# Patient Record
Sex: MALE | Race: WHITE
[De-identification: names, ages, dates, MRNs, and addresses within clinical notes are randomized per-mention and may not be internally consistent; named-entity substitution may affect disease eponyms.]

---

## 2019-07-19 ENCOUNTER — HOSPITAL ENCOUNTER (EMERGENCY)
Dept: HOSPITAL 97 - ER | Age: 64
Discharge: HOME | End: 2019-07-19
Payer: COMMERCIAL

## 2019-07-19 VITALS — OXYGEN SATURATION: 99 % | DIASTOLIC BLOOD PRESSURE: 70 MMHG | TEMPERATURE: 97.5 F | SYSTOLIC BLOOD PRESSURE: 122 MMHG

## 2019-07-19 DIAGNOSIS — K21.9: ICD-10-CM

## 2019-07-19 DIAGNOSIS — I10: ICD-10-CM

## 2019-07-19 DIAGNOSIS — R10.30: Primary | ICD-10-CM

## 2019-07-19 LAB
BUN BLD-MCNC: 17 MG/DL (ref 7–18)
GLUCOSE SERPLBLD-MCNC: 100 MG/DL (ref 74–106)
HCT VFR BLD CALC: 41.1 % (ref 39.6–49)
LYMPHOCYTES # SPEC AUTO: 2.8 K/UL (ref 0.7–4.9)
PMV BLD: 8.4 FL (ref 7.6–11.3)
POTASSIUM SERPL-SCNC: 3.8 MMOL/L (ref 3.5–5.1)
RBC # BLD: 4.49 M/UL (ref 4.33–5.43)

## 2019-07-19 PROCEDURE — 96374 THER/PROPH/DIAG INJ IV PUSH: CPT

## 2019-07-19 PROCEDURE — 80048 BASIC METABOLIC PNL TOTAL CA: CPT

## 2019-07-19 PROCEDURE — 99284 EMERGENCY DEPT VISIT MOD MDM: CPT

## 2019-07-19 PROCEDURE — 96361 HYDRATE IV INFUSION ADD-ON: CPT

## 2019-07-19 PROCEDURE — 96375 TX/PRO/DX INJ NEW DRUG ADDON: CPT

## 2019-07-19 PROCEDURE — 36415 COLL VENOUS BLD VENIPUNCTURE: CPT

## 2019-07-19 PROCEDURE — 85025 COMPLETE CBC W/AUTO DIFF WBC: CPT

## 2019-07-19 PROCEDURE — 74177 CT ABD & PELVIS W/CONTRAST: CPT

## 2019-07-19 NOTE — ER
Nurse's Notes                                                                                     

 [facility]                                                                 

Name: Lee Álvarez                                                                                  

Age: 63 yrs                                                                                       

Sex: Male                                                                                         

: 1955                                                                                   

MRN: [id number]                                                                                   

Arrival Date: 2019                                                                          

Time: 18:15                                                                                       

Account#: [id number]                                                                            

Bed 20                                                                                            

Private MD: Miles Duran E                                                                     

Diagnosis: Lower abdominal pain, unspecified                                                      

                                                                                                  

Presentation:                                                                                     

                                                                                             

18:21 Presenting complaint: Patient states: "I woke up this morning with low abdominal pain   aj1 

      below the navel and in the groin. Through out the day the pain has remained constant.       

      I've been nauseous, and feeling light headed I've had several bowel movements" Patient      

      reports a history of inguinal hernia that has been suspected to cause him pain in the       

      same area, he has also been seeing Dr. Gonzales who is trying to figure out if he has          

      Crohns disease. Today is the worst he has felt so he came to the ER for evaluation.         

      Transition of care: patient was not received from another setting of care. Onset of         

      symptoms was 2019. Risk Assessment: Do you want to hurt yourself or someone        

      else? Patient reports no desire to harm self or others. Initial Sepsis Screen: Does the     

      patient meet any 2 criteria? No. Patient's initial sepsis screen is negative. Does the      

      patient have a suspected source of infection? Yes: Acute abdominal pain. Care prior to      

      arrival: None.                                                                              

18:21 Method Of Arrival: Ambulatory                                                           aj1 

18:21 Acuity: GERRI 3                                                                           aj1 

                                                                                                  

Triage Assessment:                                                                                

18:28 General: Appears in no apparent distress. uncomfortable, Behavior is calm, cooperative, aj1 

      appropriate for age. Pain: Complains of pain in left lower quadrant and pelvis Pain         

      currently is 3 out of 10 on a pain scale. Neuro: Level of Consciousness is awake,           

      alert, obeys commands. Cardiovascular: Patient's skin is warm and dry. Respiratory:         

      Airway is patent Respiratory effort is even, unlabored, Respiratory pattern is regular,     

      symmetrical. GI: Reports lower abdominal pain, nausea, Patient currently denies             

      vomiting.                                                                                   

                                                                                                  

Historical:                                                                                       

- Allergies:                                                                                      

18:28 No Known Allergies;                                                                     aj1 

- Home Meds:                                                                                      

18:28 amitriptyline-chlordiazepoxide 25-10 mg oral tab 1 tab in the evening [Active];         aj1 

      amlodipine 10 mg tab 1 tab once daily [Active]; metoprolol succinate 50 mg oral Tb24 1      

      tab once daily [Active]; esomeprazole magnesium 40 mg oral cpDR 1 cap 2 times per day       

      [Active]; Multiple Vitamins oral tab daily [Active]; fenofibrate 150 mg oral cap 1 cap      

      once daily [Active]; alprazolam 0.5 mg Oral Tb24 1 tab as needed [Active]; Fish Oil         

      oral oral daily [Active]; mesalamine oral 1.2 GM oral 2 caps 2 times per day [Active];      

      cranberry oral oral twice a day [Active]; ranitidine HCl 150 mg Oral tab 1 tab once         

      daily [Active]; Miralax 17 gram Oral pwpk 1 packet as needed [Active];                      

- PMHx:                                                                                           

18:28 Hernia; Hypertension; GERD;                                                             aj1 

- PSHx:                                                                                           

18:28 Hernia repair; prostate surgery;                                                        aj1 

                                                                                                  

- Immunization history:: Flu vaccine is up to date.                                               

- Social history:: Smoking status: Patient/guardian denies using tobacco.                         

- Ebola Screening: : Patient denies travel to an Ebola-affected area in the 21 days               

  before illness onset.                                                                           

                                                                                                  

                                                                                                  

Screenin:29 Abuse screen: Denies threats or abuse. Nutritional screening: No deficits noted.        tw2 

      Tuberculosis screening: No symptoms or risk factors identified. Fall Risk Fall in past      

      12 months (25 points).                                                                      

                                                                                                  

Assessment:                                                                                       

18:37 General: Appears in no apparent distress. Behavior is calm, cooperative, appropriate    tw2 

      for age. Pain: Complains of pain in pelvis and left lower quadrant. Neuro: Level of         

      Consciousness is awake, alert, obeys commands, Oriented to person, place, time,             

      situation. Cardiovascular: Heart tones S1 S2 Patient's skin is warm and dry.                

      Respiratory: Airway is patent Respiratory effort is even, unlabored, Respiratory            

      pattern is regular, symmetrical, Breath sounds are clear bilaterally. GI: Bowel sounds      

      present X 4 quads. Abd is soft X 4 quads Reports lower abdominal pain, diarrhea,            

      nausea. : No signs and/or symptoms were reported regarding the genitourinary system.      

      EENT: No signs and/or symptoms were reported regarding the EENT system. Derm: No signs      

      and/or symptoms reported regarding the dermatologic system. Musculoskeletal: Range of       

      motion: intact in all extremities.                                                          

19:05 General: Appears in no apparent distress. comfortable, Behavior is calm, cooperative,   rr5 

      appropriate for age. Neuro: Level of Consciousness is awake, alert, obeys commands,         

      Oriented to person, place, time, situation. Cardiovascular: Capillary refill < 3            

      seconds Patient's skin is warm and dry. Respiratory: Airway is patent Respiratory           

      effort is even, unlabored, Respiratory pattern is regular, symmetrical. GI: Abdomen is      

      flat, Reports lower abdominal pain, diarrhea, nausea. : No signs and/or symptoms were     

      reported regarding the genitourinary system. EENT: No signs and/or symptoms were            

      reported regarding the EENT system. Derm: Skin is intact, Skin temperature is warm.         

      Musculoskeletal: Circulation, motion, and sensation intact. Capillary refill < 3            

      seconds, Range of motion: intact in all extremities.                                        

19:35 Reassessment: Patient appears in no apparent distress at this time. Patient is alert,   rr5 

      oriented x 3, equal unlabored respirations, skin warm/dry/pink. send to CT scan.            

20:20 Reassessment: Patient appears in no apparent distress at this time. Patient is alert,   rr5 

      oriented x 3, equal unlabored respirations, skin warm/dry/pink. discharge instruction       

      given and explained without complaints made.                                                

                                                                                                  

Vital Signs:                                                                                      

18:28  / 83; Pulse 79; Resp 18; Temp 98.0; Pulse Ox 98% on R/A; Weight 102.06 kg (R);   aj1 

      Height 6 ft. 4 in. (193.04 cm) (R); Pain 3/10;                                              

19:30  / 75; Pulse 70; Resp 16; Temp 97.6; Pulse Ox 98% ;                               rr5 

20:20  / 70; Pulse 75; Resp 17; Temp 97.5; Pulse Ox 99% on R/A;                         rr5 

18:28 Body Mass Index 27.39 (102.06 kg, 193.04 cm)                                            aj1 

                                                                                                  

ED Course:                                                                                        

18:15 Patient arrived in ED.                                                                  rg4 

18:15 Miles Duran MD is Private Physician.                                                rg4 

18:23 Triage completed.                                                                       aj1 

18:29 Arm band placed on.                                                                     tw2 

18:31 Elinor Perez, NEGAR is Primary Nurse.                                                        tw2 

18:31 [facility]                                                        kb  

18:31 Jethro Kaur MD is Attending Physician.                                              kb  

18:31 Bed in low position. Call light in reach. Adult w/ patient.                             tw2 

18:42 Radiology exam delayed due to lab results not completed at this time. (BUN/Creatinine). vm2 

18:53 Inserted saline lock: 20 gauge in right antecubital area, using aseptic technique.      tw2 

      Blood collected.                                                                            

18:59 Report given to NEGAR Uribe.                                                             tw2 

19:14 Radiology exam delayed due to lab results not completed at this time. (BUN/Creatinine). vm2 

19:49 CT completed. Patient tolerated procedure well. Patient moved to CT via wheelchair.     nj  

      Patient moved back from CT.                                                                 

19:50 CT Abd/Pelvis - IV Contrast Only In Process Unspecified.                                EDMS

20:20 No provider procedures requiring assistance completed. IV discontinued, intact,         rr5 

      bleeding controlled, No redness/swelling at site. Pressure dressing applied.                

                                                                                                  

Administered Medications:                                                                         

18:53 Drug: Zofran 4 mg Route: IVP; Site: right antecubital;                                  tw2 

20:00 Follow up: Response: No adverse reaction                                                rr5 

18:55 Drug: morphine 4 mg Route: IVP; Site: right antecubital;                                tw2 

20:00 Follow up: Response: No adverse reaction                                                rr5 

18:56 Drug: NS 0.9% 1000 ml Route: IV; Rate: 1000 ml; Site: right antecubital;                tw2 

20:00 Follow up: Response: No adverse reaction; IV Status: Completed infusion; IV Intake:     rr5 

      1000ml                                                                                      

                                                                                                  

                                                                                                  

Intake:                                                                                           

20:00 IV: 1000ml; Total: 1000ml.                                                              rr5 

                                                                                                  

Outcome:                                                                                          

20:05 Discharge ordered by MD.                                                                kb  

20:20 Discharged to home ambulatory, with family.                                             rr5 

20:20 Condition: stable                                                                           

20:20 Discharge instructions given to patient, family, Instructed on discharge instructions,      

      follow up and referral plans. medication usage, Demonstrated understanding of               

      instructions, follow-up care, medications, Prescriptions given X 2.                         

20:35 Patient left the ED.                                                                    rr5 

                                                                                                  

Signatures:                                                                                       

Dispatcher [employer]                           EDMS                                                 

Emerald Cabello, BUZZP-C                 BUZZP-Chula Calixto, RN                     RN   Elinor Dean RN                          RN   2                                                  

Susana Bustamante Nathan nj McGuire, Victoria                            2                                                  

Rony Ventura, NEGAR                      RN   rr5                                                  

                                                                                                  

**************************************************************************************************

## 2019-07-19 NOTE — RAD REPORT
EXAM DESCRIPTION:  CTAbdomen   Pelvis W Contrast - 7/19/2019 7:49 pm

 

CLINICAL HISTORY:  Abdominal pain.

ABD PAIN

 

COMPARISON:  Abdomen   Pelvis W Contrast dated 5/30/2019; Abdomen   Pelvis W Contrast dated 7/10/2018
; Abdomen   Pelvis W Contrast dated 9/30/2016; CT ABD PELVIS W CONTRAST dated 11/6/2014

 

TECHNIQUE:  Biphasic CT imaging of the abdomen and pelvis was performed with 100 ml non-ionic IV cont
rast.

 

All CT scans are performed using dose optimization technique as appropriate and may include automated
 exposure control or mA/KV adjustment according to patient size.

 

FINDINGS:  The lung bases are clear.

 

The liver, spleen, pancreas, adrenal glands and kidneys are within normal limits. Benign bilateral re
nal cysts.

 

No bowel obstruction, free air, free fluid or abscess. Mild sigmoid diverticulosis is present without
 diverticulitis. Prominent fecal retention in the colon. The appendix is normal.  No evidence of sign
ificant lymphadenopathy.

 

No suspicious bony findings. Small bilateral fat containing inguinal hernias. Evidence of previous ri
ght inguinal hernia repair.

 

IMPRESSION:  No acute intra-abdominal or pelvic finding.

 

Mild fecal retention.

## 2019-07-19 NOTE — XMS REPORT
Patient Summary Document

 Created on:2019



Patient:SHARIFA MASSEY

Sex:Male

:1955

External Reference #:[id number]





Demographics







 Address  [address] [place] [address]

 

 Home Phone  [phone]

 

 Work Phone  [phone]

 

 Email Address  [email or web address]

 

 Preferred Language  Unknown

 

 Marital Status  Unknown

 

 [employer] Affiliation  Unknown

 

 Race  Unknown

 

 Additional Race(s)  Unavailable

 

 Ethnic Group  Unknown









Author







 [facility]  [facility]

 

 Address  [address] [place] [address] Dr. Sloan. [address] [place] [id number]

 

 Phone  [phone]









Care Team Providers







 Name  Role  Phone

 

 DR JESSICA MARTINEZ  Unavailable  Unavailable









Problems

This patient has no known problems.



Allergies, Adverse Reactions, Alerts

This patient has no known allergies or adverse reactions.



Medications

This patient has no known medications.



Encounters







 Start  End  Encounter  Admission  Attending  Care  Care  Encounter



 Date/Time  Date/Time  Type  Type  Clinicians  Facility  Department  ID

 

 2018  Outpatient  GABRIELE CARRILLO  [facility] [place]  [id number]



 04:00:00  07:00:00      JESSICA

## 2019-07-19 NOTE — EDPHYS
Physician Documentation                                                                           

 [facility]                                                                 

Name: Lee Álvarez                                                                                  

Age: 63 yrs                                                                                       

Sex: Male                                                                                         

: 1955                                                                                   

MRN: [id number]                                                                                   

Arrival Date: 2019                                                                          

Time: 18:15                                                                                       

Account#: [id number]                                                                            

Bed 20                                                                                            

Private MD: Miles Duran E                                                                     

ED Physician Jethro Kaur                                                                       

HPI:                                                                                              

                                                                                             

18:49 This 63 yrs old  Male presents to ER via Ambulatory with complaints of Groin   kb  

      Pain, Abdominal Pain, Nausea.                                                               

18:49 The patient presents with abdominal pain in the lower abdomen. Onset: The               kb  

      symptoms/episode began/occurred this morning. The symptoms do not radiate. Associated       

      signs and symptoms: Pertinent positives: diarrhea, nausea. The symptoms are described       

      as waxing/waning. Modifying factors: The symptoms are alleviated by nothing, the            

      symptoms are aggravated by nothing. Severity of pain: At its worst the pain was             

      moderate in the emergency department the pain has improved. The patient has experienced     

      similar episodes in the past. The patient has been recently seen by a physician:. Pt        

      reports he is being tested for crohn's. States he gets this lower abd pain and              

      diarrhea, which they have called a crohn's flare-up. Told to start antibiotics when         

      this happens so he started cipro and flagyl on Monday. Was seen by Dr Weiss yesterday      

      and told to continue antibiotics. States the pain normally gets better throughout the       

      day when he has it, but today it didn't so that is why he came in. .                        

                                                                                                  

Historical:                                                                                       

- Allergies:                                                                                      

18:28 No Known Allergies;                                                                     aj1 

- Home Meds:                                                                                      

18:28 amitriptyline-chlordiazepoxide 25-10 mg oral tab 1 tab in the evening [Active];         aj1 

      amlodipine 10 mg tab 1 tab once daily [Active]; metoprolol succinate 50 mg oral Tb24 1      

      tab once daily [Active]; esomeprazole magnesium 40 mg oral cpDR 1 cap 2 times per day       

      [Active]; Multiple Vitamins oral tab daily [Active]; fenofibrate 150 mg oral cap 1 cap      

      once daily [Active]; alprazolam 0.5 mg Oral Tb24 1 tab as needed [Active]; Fish Oil         

      oral oral daily [Active]; mesalamine oral 1.2 GM oral 2 caps 2 times per day [Active];      

      cranberry oral oral twice a day [Active]; ranitidine HCl 150 mg Oral tab 1 tab once         

      daily [Active]; Miralax 17 gram Oral pwpk 1 packet as needed [Active];                      

- PMHx:                                                                                           

18:28 Hernia; Hypertension; GERD;                                                             aj1 

- PSHx:                                                                                           

18:28 Hernia repair; prostate surgery;                                                        aj1 

                                                                                                  

- Immunization history:: Flu vaccine is up to date.                                               

- Social history:: Smoking status: Patient/guardian denies using tobacco.                         

- Ebola Screening: : Patient denies travel to an Ebola-affected area in the 21 days               

  before illness onset.                                                                           

                                                                                                  

                                                                                                  

ROS:                                                                                              

18:46 Constitutional: Negative for fever, chills, and weight loss, Neck: Negative for injury, kb  

      pain, and swelling, Cardiovascular: Negative for chest pain, palpitations, and edema,       

      Respiratory: Negative for shortness of breath, cough, wheezing, and pleuritic chest         

      pain, Back: Negative for injury and pain, : Negative for injury, bleeding, discharge,     

      and swelling, MS/Extremity: Negative for injury and deformity, Skin: Negative for           

      injury, rash, and discoloration, Neuro: Negative for headache, weakness, numbness,          

      tingling, and seizure.                                                                      

18:46 Abdomen/GI: Positive for abdominal pain, nausea, diarrhea, Negative for vomiting,           

      constipation, abdominal cramps, abdominal distension, anorexia.                             

                                                                                                  

Exam:                                                                                             

18:46 Constitutional:  This is a well developed, well nourished patient who is awake, alert,  kb  

      and in no acute distress. Head/Face:  Normocephalic, atraumatic. Neck:  Trachea             

      midline, no thyromegaly or masses palpated, and no cervical lymphadenopathy.  Supple,       

      full range of motion without nuchal rigidity, or vertebral point tenderness.  No            

      Meningismus. Chest/axilla:  Normal chest wall appearance and motion.  Nontender with no     

      deformity.  No lesions are appreciated. Cardiovascular:  Regular rate and rhythm with a     

      normal S1 and S2.  No gallops, murmurs, or rubs.  Normal PMI, no JVD.  No pulse             

      deficits. Respiratory:  Lungs have equal breath sounds bilaterally, clear to                

      auscultation and percussion.  No rales, rhonchi or wheezes noted.  No increased work of     

      breathing, no retractions or nasal flaring. Abdomen/GI:  Soft, non-tender, with normal      

      bowel sounds.  No distension or tympany.  No guarding or rebound.  No evidence of           

      tenderness throughout. Skin:  Warm, dry with normal turgor.  Normal color with no           

      rashes, no lesions, and no evidence of cellulitis. MS/ Extremity:  Pulses equal, no         

      cyanosis.  Neurovascular intact.  Full, normal range of motion. Neuro:  Awake and           

      alert, GCS 15, oriented to person, place, time, and situation.  Cranial nerves II-XII       

      grossly intact.  Motor strength 5/5 in all extremities.  Sensory grossly intact.            

      Cerebellar exam normal.  Normal gait.                                                       

                                                                                                  

Vital Signs:                                                                                      

18:28  / 83; Pulse 79; Resp 18; Temp 98.0; Pulse Ox 98% on R/A; Weight 102.06 kg (R);   aj1 

      Height 6 ft. 4 in. (193.04 cm) (R); Pain 3/10;                                              

19:30  / 75; Pulse 70; Resp 16; Temp 97.6; Pulse Ox 98% ;                               rr5 

20:20  / 70; Pulse 75; Resp 17; Temp 97.5; Pulse Ox 99% on R/A;                         rr5 

18:28 Body Mass Index 27.39 (102.06 kg, 193.04 cm)                                            aj1 

                                                                                                  

MDM:                                                                                              

18:31 Patient medically screened.                                                             kb  

18:48 Data reviewed: vital signs, nurses notes. Data interpreted: Pulse oximetry: on room air kb  

      is 98 %. Interpretation: normal.                                                            

20:04 Counseling: I had a detailed discussion with the patient and/or guardian regarding: the kb  

      historical points, exam findings, and any diagnostic results supporting the                 

      discharge/admit diagnosis, lab results, radiology results, the need for outpatient          

      follow up, a family practitioner, to return to the emergency department if symptoms         

      worsen or persist or if there are any questions or concerns that arise at home. ED          

      course: Pt feeling better after treatment. Will follow up with Dr Weiss.                   

                                                                                                  

                                                                                             

18:40 Order name: CBC with Diff; Complete Time: 19:12                                         kb  

                                                                                             

18:40 Order name: Basic Metabolic Panel; Complete Time: 19:22                                 kb  

                                                                                             

18:40 Order name: CT Abd/Pelvis - IV Contrast Only; Complete Time: 19:58                      kb  

                                                                                             

18:40 Order name: IV Start; Complete Time: 18:56                                              kb  

                                                                                                  

Administered Medications:                                                                         

18:53 Drug: Zofran 4 mg Route: IVP; Site: right antecubital;                                  tw2 

20:00 Follow up: Response: No adverse reaction                                                rr5 

18:55 Drug: morphine 4 mg Route: IVP; Site: right antecubital;                                tw2 

20:00 Follow up: Response: No adverse reaction                                                rr5 

18:56 Drug: NS 0.9% 1000 ml Route: IV; Rate: 1000 ml; Site: right antecubital;                tw2 

20:00 Follow up: Response: No adverse reaction; IV Status: Completed infusion; IV Intake:     rr5 

      1000ml                                                                                      

                                                                                                  

                                                                                                  

Disposition:                                                                                      

                                                                                             

17:58 Co-signature as Attending Physician, Jethro Kaur MD.                                    

                                                                                                  

Disposition:                                                                                      

19 20:05 Discharged to Home. Impression: Lower abdominal pain, unspecified.                 

- Condition is Stable.                                                                            

- Discharge Instructions: Abdominal Pain, Adult, Easy-to-Read.                                    

- Prescriptions for Bentyl 20 mg Oral Tablet - take 1 tablet by ORAL route every 6                

  hours As needed; 20 tablet. Zofran 4 mg Oral Tablet - take 1 tablet by ORAL route               

  every 6 hours As needed; 20 tablet.                                                             

- Medication Reconciliation Form, Thank You Letter, Antibiotic Education, Prescription            

  Opioid Use form.                                                                                

- Follow up: Emergency Department; When: As needed; Reason: Worsening of condition.               

  Follow up: Private Physician; When: 2 - 3 days; Reason: Recheck today's complaints,             

  Continuance of care, Re-evaluation by your physician.                                           

                                                                                                  

                                                                                                  

                                                                                                  

Signatures:                                                                                       

Dispatcher [employer]                           EDEmerald Jacques, ROSEMARIE GERBERP-Chula Calixto, RN                     RN   aj1                                                  

Elinor Perez RN                          RN   tw2                                                  

Jethro Kaur MD MD                                                      

Rony Ventura RN                      RN   rr5                                                  

                                                                                                  

Corrections: (The following items were deleted from the chart)                                    

                                                                                             

20:35 20:05 2019 20:05 Discharged to Home. Impression: Lower abdominal pain,            rr5 

      unspecified. Condition is Stable. Forms are Medication Reconciliation Form, Thank You       

      Letter, Antibiotic Education, Prescription Opioid Use. Follow up: Emergency Department;     

      When: As needed; Reason: Worsening of condition. Follow up: Private Physician; When: 2      

      - 3 days; Reason: Recheck today's complaints, Continuance of care, Re-evaluation by         

      your physician. kb                                                                          

                                                                                                  

**************************************************************************************************

## 2019-08-11 ENCOUNTER — HOSPITAL ENCOUNTER (INPATIENT)
Dept: HOSPITAL 97 - ER | Age: 64
LOS: 3 days | Discharge: HOME | DRG: 310 | End: 2019-08-14
Attending: FAMILY MEDICINE | Admitting: HOSPITALIST
Payer: COMMERCIAL

## 2019-08-11 VITALS — BODY MASS INDEX: 27.8 KG/M2

## 2019-08-11 DIAGNOSIS — F41.9: ICD-10-CM

## 2019-08-11 DIAGNOSIS — E78.5: ICD-10-CM

## 2019-08-11 DIAGNOSIS — I48.0: Primary | ICD-10-CM

## 2019-08-11 DIAGNOSIS — I10: ICD-10-CM

## 2019-08-11 LAB
ALBUMIN SERPL BCP-MCNC: 4.1 G/DL (ref 3.4–5)
ALP SERPL-CCNC: 70 U/L (ref 45–117)
ALT SERPL W P-5'-P-CCNC: 36 U/L (ref 12–78)
AST SERPL W P-5'-P-CCNC: 30 U/L (ref 15–37)
BUN BLD-MCNC: 13 MG/DL (ref 7–18)
GLUCOSE SERPLBLD-MCNC: 105 MG/DL (ref 74–106)
HCT VFR BLD CALC: 41.1 % (ref 39.6–49)
INR BLD: 0.98
LYMPHOCYTES # SPEC AUTO: 4.3 K/UL (ref 0.7–4.9)
MAGNESIUM SERPL-MCNC: 2 MG/DL (ref 1.8–2.4)
NT-PROBNP SERPL-MCNC: 38 PG/ML (ref ?–125)
PMV BLD: 8.4 FL (ref 7.6–11.3)
POTASSIUM SERPL-SCNC: 3.5 MMOL/L (ref 3.5–5.1)
RBC # BLD: 4.48 M/UL (ref 4.33–5.43)
TROPONIN (EMERG DEPT USE ONLY): < 0.02 NG/ML (ref 0–0.04)

## 2019-08-11 PROCEDURE — 96361 HYDRATE IV INFUSION ADD-ON: CPT

## 2019-08-11 PROCEDURE — 85610 PROTHROMBIN TIME: CPT

## 2019-08-11 PROCEDURE — 84439 ASSAY OF FREE THYROXINE: CPT

## 2019-08-11 PROCEDURE — 80061 LIPID PANEL: CPT

## 2019-08-11 PROCEDURE — 81003 URINALYSIS AUTO W/O SCOPE: CPT

## 2019-08-11 PROCEDURE — 93306 TTE W/DOPPLER COMPLETE: CPT

## 2019-08-11 PROCEDURE — 84443 ASSAY THYROID STIM HORMONE: CPT

## 2019-08-11 PROCEDURE — 99285 EMERGENCY DEPT VISIT HI MDM: CPT

## 2019-08-11 PROCEDURE — 96374 THER/PROPH/DIAG INJ IV PUSH: CPT

## 2019-08-11 PROCEDURE — 80048 BASIC METABOLIC PNL TOTAL CA: CPT

## 2019-08-11 PROCEDURE — 85025 COMPLETE CBC W/AUTO DIFF WBC: CPT

## 2019-08-11 PROCEDURE — 93005 ELECTROCARDIOGRAM TRACING: CPT

## 2019-08-11 PROCEDURE — 83735 ASSAY OF MAGNESIUM: CPT

## 2019-08-11 PROCEDURE — 71045 X-RAY EXAM CHEST 1 VIEW: CPT

## 2019-08-11 PROCEDURE — 96372 THER/PROPH/DIAG INJ SC/IM: CPT

## 2019-08-11 PROCEDURE — 84484 ASSAY OF TROPONIN QUANT: CPT

## 2019-08-11 PROCEDURE — 83880 ASSAY OF NATRIURETIC PEPTIDE: CPT

## 2019-08-11 PROCEDURE — 36415 COLL VENOUS BLD VENIPUNCTURE: CPT

## 2019-08-11 PROCEDURE — 80076 HEPATIC FUNCTION PANEL: CPT

## 2019-08-11 NOTE — XMS REPORT
Patient Summary Document

 Created on:2019



Patient:SHARIFA MASSEY

Sex:Male

:1955

External Reference #:[id number]





Demographics







 Address  [address] [place] [address]

 

 Home Phone  [phone]

 

 Work Phone  [phone]

 

 Email Address  [email or web address]

 

 Preferred Language  Unknown

 

 Marital Status  Unknown

 

 Jehovah's witness Affiliation  Unknown

 

 Race  Unknown

 

 Additional Race(s)  Unavailable

 

 Ethnic Group  Unknown









Author







 Organization  [facility]

 

 Address  [address] [place] [address] Dr. Sloan. [address] [place] [id number]

 

 Phone  [phone]









Care Team Providers







 Name  Role  Phone

 

 DR JESSICA MARTINEZ  Unavailable  Unavailable









Problems

This patient has no known problems.



Allergies, Adverse Reactions, Alerts

This patient has no known allergies or adverse reactions.



Medications

This patient has no known medications.



Encounters







 Start  End  Encounter  Admission  Attending  Care  Care  Encounter



 Date/Time  Date/Time  Type  Type  Clinicians  Facility  Department  ID

 

 2018  Outpatient  [facility]  [id number]



 04:00:00  07:00:00      JESSICA

## 2019-08-12 LAB
HCT VFR BLD CALC: 37.7 % (ref 39.6–49)
HDLC SERPL-MCNC: 43 MG/DL (ref 40–60)
LDLC SERPL CALC-MCNC: 85 MG/DL (ref ?–130)
LYMPHOCYTES # SPEC AUTO: 4.1 K/UL (ref 0.7–4.9)
MAGNESIUM SERPL-MCNC: 2.1 MG/DL (ref 1.8–2.4)
PMV BLD: 8.6 FL (ref 7.6–11.3)
RBC # BLD: 4.17 M/UL (ref 4.33–5.43)
TROPONIN I: < 0.02 NG/ML (ref 0–0.04)
TSH SERPL DL<=0.05 MIU/L-ACNC: 1.23 UIU/ML (ref 0.36–3.74)
UA DIPSTICK W REFLEX MICRO PNL UR: (no result)

## 2019-08-12 RX ADMIN — RIVAROXABAN SCH MG: 20 TABLET, FILM COATED ORAL at 17:11

## 2019-08-12 RX ADMIN — Medication SCH ML: at 09:20

## 2019-08-12 RX ADMIN — FENOFIBRATE SCH MG: 160 TABLET ORAL at 21:24

## 2019-08-12 RX ADMIN — Medication SCH: at 21:00

## 2019-08-12 RX ADMIN — ASPIRIN SCH MG: 81 TABLET, COATED ORAL at 09:20

## 2019-08-12 RX ADMIN — Medication SCH ML: at 21:25

## 2019-08-12 RX ADMIN — SOTALOL HYDROCHLORIDE SCH MG: 80 TABLET ORAL at 17:09

## 2019-08-12 RX ADMIN — PANTOPRAZOLE SODIUM SCH MG: 40 TABLET, DELAYED RELEASE ORAL at 21:24

## 2019-08-12 NOTE — EKG
Test Date:    2019-08-12               Test Time:    10:00:41

Technician:   CARMELINA                                     

                                                     

MEASUREMENT RESULTS:                                       

Intervals:                                           

Rate:         76                                     

WY:                                                  

QRSD:         86                                     

QT:           340                                    

QTc:          382                                    

Axis:                                                

P:                                                   

WY:                                                  

QRS:          67                                     

T:            80                                     

                                                     

INTERPRETIVE STATEMENTS:                                       

                                                     

Atrial fibrillation

Abnormal ECG

Compared to ECG 08/11/2019 23:08:28

ST (T wave) deviation no longer present



Electronically Signed On 08-12-19 10:38:51 CDT by Zaheer Awad

## 2019-08-12 NOTE — RAD REPORT
EXAM DESCRIPTION:  RAD - Chest Single View - 8/11/2019 11:22 pm

 

CLINICAL HISTORY:  SOB;Palpitations

Chest pain.

 

COMPARISON:  Chest Pa And Lat (2 Views) dated 7/2/2019; Chest Pa And Lat (2 Views) dated 7/24/2018; C
HEST SINGLE VIEW dated 10/8/2012; CHEST SINGLE VIEW dated 9/30/2009

 

FINDINGS:  Portable technique limits examination quality.

 

Mild emphysematous changes are present throughout the lungs. The heart is mildly prominent in size. N
o displaced fractures.

 

IMPRESSION:  Mild COPD.

## 2019-08-12 NOTE — EDPHYS
Physician Documentation                                                                           

 [facility]                                                                 

Name: Lee Álvarez                                                                                  

Age: 63 yrs                                                                                       

Sex: Male                                                                                         

: 1955                                                                                   

MRN: [id number]                                                                                   

Arrival Date: 2019                                                                          

Time: 22:52                                                                                       

Account#: [id number]                                                                            

Bed 2                                                                                             

Private MD:                                                                                       

ED Physician Jethro Kaur                                                                       

HPI:                                                                                              

                                                                                             

00:22 This 63 yrs old  Male presents to ER via Ambulatory with complaints of         pm1 

      Shortness Of Breath.                                                                        

00:22 The patient has shortness of breath that occurred at home, walking up the stairs.       pm1 

      Onset: The symptoms/episode began/occurred just prior to arrival. Duration: The             

      symptoms are continuous. The patient's shortness of breath is aggravated by exertion,       

      is alleviated by nothing. Associated signs and symptoms: Pertinent positives: chest         

      pain, Pertinent negatives: non-productive cough, diaphoresis, fever, nausea, vomiting.      

      Severity of symptoms: in the emergency department the symptoms are unchanged. The           

      patient has experienced a previous episode, approximately 30 years ago. The patient has     

      not recently seen a physician, [facility].                               

                                                                                                  

Historical:                                                                                       

- Allergies:                                                                                      

                                                                                             

23:10 No Known Allergies;                                                                     ss  

- Home Meds:                                                                                      

23:10 amitriptyline-chlordiazepoxide 25-10 mg Oral tab 1 tab in the evening [Active];         ss  

      amlodipine 10 mg tab 1 tab once daily [Active]; metoprolol succinate 50 mg Oral Tb24 1      

      tab once daily [Active]; esomeprazole magnesium 40 mg Oral cpDR 1 cap 2 times per day       

      [Active]; Multiple Vitamins Oral tab daily [Active]; alprazolam 0.5 mg Oral Tb24 1 tab      

      as needed [Active]; Fish Oil Oral daily [Active]; cranberry Oral twice a day [Active];      

      ranitidine HCl 150 mg Oral tab 1 tab once daily [Active]; Miralax 17 gram Oral pwpk 1       

      packet as needed [Active]; fenofibrate 145 Oral cap 1 cap once daily [Active];              

- PMHx:                                                                                           

23:10 GERD; Hernia; Hypertension;                                                             ss  

- PSHx:                                                                                           

23:10 Hernia repair; prostate surgery;                                                        ss  

                                                                                                  

- Immunization history:: Adult Immunizations up to date.                                          

- Social history:: Smoking status: Patient/guardian denies using tobacco.                         

- Ebola Screening: : Patient denies exposure to infectious person Patient denies travel           

  to an Ebola-affected area in the 21 days before illness onset.                                  

                                                                                                  

                                                                                                  

ROS:                                                                                              

                                                                                             

00:22 Constitutional: Negative for fever, chills, and weight loss, Eyes: Negative for injury, pm1 

      pain, redness, and discharge, ENT: Negative for injury, pain, and discharge, Neck:          

      Negative for injury, pain, and swelling.                                                    

      Abdomen/GI: Negative for abdominal pain, nausea, vomiting, diarrhea, and constipation,      

      Back: Negative for injury and pain, : Negative for injury, bleeding, discharge, and       

      swelling, MS/Extremity: Negative for injury and deformity, Skin: Negative for injury,       

      rash, and discoloration, Neuro: Negative for headache, weakness, numbness, tingling,        

      and seizure.                                                                                

      Cardiovascular: Positive for chest pain, palpitations, Negative for edema, orthopnea.       

      Respiratory: Positive for shortness of breath, Negative for cough, sputum production,       

      wheezing.                                                                                   

                                                                                                  

Exam:                                                                                             

00:22 Constitutional:  This is a well developed, well nourished patient who is awake, alert,  pm1 

      and in no acute distress. Head/Face:  Normocephalic, atraumatic. Eyes:  Pupils equal        

      round and reactive to light, extra-ocular motions intact.  Lids and lashes normal.          

      Conjunctiva and sclera are non-icteric and not injected.  Cornea within normal limits.      

      Periorbital areas with no swelling, redness, or edema. Chest/axilla:  Normal chest wall     

      appearance and motion.  Nontender with no deformity.  No lesions are appreciated.           

00:22 Respiratory:  Lungs have equal breath sounds bilaterally, clear to auscultation and         

      percussion.  No rales, rhonchi or wheezes noted.  No increased work of breathing, no        

      retractions or nasal flaring. Abdomen/GI:  Soft, non-tender, with normal bowel sounds.      

      No distension or tympany.  No guarding or rebound.  No evidence of tenderness               

      throughout. Back:  No spinal tenderness.  No costovertebral tenderness.  Full range of      

      motion. Skin:  Warm, dry with normal turgor.  Normal color with no rashes, no lesions,      

      and no evidence of cellulitis. MS/ Extremity:  Pulses equal, no cyanosis.                   

      Neurovascular intact.  Full, normal range of motion.                                        

00:22 Cardiovascular: Rate: tachycardic, Rhythm: irregular, Pulses: no pulse deficits are         

      appreciated, Heart sounds: normal, normal S1and S2, Edema: is not appreciated.              

00:22 Neuro: Orientation: is normal, Motor: is normal, moves all fours, Sensation: is normal,     

      no obvious gross deficits.                                                                  

                                                                                                  

Vital Signs:                                                                                      

                                                                                             

23:07  / 95; Pulse 130; Resp 22; Temp 98.2(TE); Pulse Ox 98% on R/A; Weight 102.06 kg;  ss  

      Height 6 ft. 4 in. (193.04 cm); Pain 0/10;                                                  

23:41  / 94; Pulse 84; Resp 18; Pulse Ox 99% on R/A; Pain 0/10;                         ss  

08                                                                                             

00:01  / 78; Pulse 97; Resp 17; Pulse Ox 99% on R/A; Pain 0/10;                         ss  

00:49  / 75; Pulse 96; Resp 14; Pulse Ox 100% on R/A;                                   ak1 

01:38  / 93; Pulse 86; Resp 20; Temp 98.2; Pulse Ox 96% on R/A;                         ak1 

                                                                                             

23:07 Body Mass Index 27.39 (102.06 kg, 193.04 cm)                                            ss  

                                                                                                  

MDM:                                                                                              

                                                                                             

23:01 Patient medically screened.                                                             pm1 

                                                                                             

00:26 Data reviewed: vital signs. Data interpreted: Pulse oximetry: on room air is 99 %.      pm1 

      Interpretation: normal. Counseling: I had a detailed discussion with the patient and/or     

      guardian regarding: the historical points, exam findings, and any diagnostic results        

      supporting the discharge/admit diagnosis, lab results, radiology results, the need for      

      further work-up and treatment in the hospital.                                              

00:26 Physician consultation: Abdi Elizabeth MD was called at 00:29, was contacted at 00:29,   pm1 

      regarding admission, patient's condition.                                                   

                                                                                                  

                                                                                             

23:01 Order name: Basic Metabolic Panel; Complete Time: 00:                                 pm                                                                                             

23:01 Order name: CBC with Diff; Complete Time: 00:                                         pm                                                                                             

23:01 Order name: LFT's; Complete Time: 00:                                                 pm                                                                                             

23:01 Order name: Magnesium; Complete Time: 00:09                                             pm                                                                                             

23:01 Order name: NT PRO-BNP; Complete Time: 00:09                                            pm                                                                                             

23:01 Order name: PT-INR; Complete Time: 00:09                                                pm/11                                                                                             

23:01 Order name: Troponin (emerg Dept Use Only); Complete Time: 00:09                        pm1 

                                                                                             

00:14 Order name: Add On-Lab                                                                    

                                                                                             

00:20 Order name: TSH; Complete Time: 01:13                                                   pm1 

                                                                                             

00:48 Order name: Magnesium                                                                   EDMS

                                                                                             

00:48 Order name: T4 Free                                                                     EDMS

                                                                                             

00:48 Order name: Thyroid Stimulating Hormone                                                 EDMS

                                                                                             

00:48 Order name: Lipid Profile                                                               EDMS

                                                                                             

00:48 Order name: Troponin I                                                                  EDMS

                                                                                             

23:01 Order name: XRAY Chest (1 view)                                                         pm1 

                                                                                             

23:01 Order name: EKG; Complete Time: 23:06                                                   pm1 

                                                                                             

23:01 Order name: Cardiac monitoring; Complete Time: 23:11                                    pm1 

                                                                                             

23:01 Order name: EKG - Nurse/Tech; Complete Time: 23:11                                      pm1 

                                                                                             

23:01 Order name: IV Saline Lock; Complete Time: 23:11                                        pm1 

                                                                                             

23:01 Order name: Labs collected and sent; Complete Time: 23:11                               pm1 

                                                                                             

00:48 Order name: CONS Physician Consult                                                      EDMS

                                                                                             

00:48 Order name: Heart Healthy                                                               EDMS

                                                                                             

00:48 Order name: Echo with Doppler                                                           EDMS

                                                                                             

00:48 Order name: EKG Electrocardiogram                                                       EDMS

                                                                                             

00:48 Order name: EKG Electrocardiogram                                                       EDMS

                                                                                             

00:49 Order name: CBC with Automated Diff                                                     EDMS

                                                                                             

23:01 Order name: O2 Per Protocol; Complete Time: 23:11                                       pm1 

                                                                                             

23:01 Order name: O2 Sat Monitoring; Complete Time: 23:11                                     pm1 

                                                                                                  

Administered Medications:                                                                         

                                                                                             

23:18 Drug: Lopressor 5 mg Route: IVP; Site: right antecubital;                                 

23:41 Drug: Lopressor 5 mg Route: IVP; Site: right antecubital;                                 

                                                                                             

00:12 Follow up: Response: No adverse reaction; No adverse reaction, pulse is lowered.          

                                                                                             

23:41 Drug: NS 0.9% 1000 ml Route: IV; Rate: 1000 ml; Site: right antecubital;                  

                                                                                             

00:50 Follow up: IV Status: Completed infusion; IV Intake: 1000ml                             ak1 

01:32 Follow up: IV Status: Completed infusion; IV Intake: 1000ml                             ak1 

00:18 Drug: Lovenox 1 mg/kg Route: Sub-Q; Site: right lower abdomen;                            

00:33 Follow up: Response: No adverse reaction                                                ss  

00:34 Drug: Lopressor 25 mg Route: PO;                                                        ss  

00:50 Follow up: Response: No adverse reaction                                                ak1 

                                                                                                  

                                                                                                  

Disposition:                                                                                      

19 00:29 Hospitalization ordered by Abdi Elizabeth for Inpatient Admission. Preliminary     

  diagnosis is Atrial fibrillation and flutter - New onset.                                       

- Bed requested for Telemetry/MedSurg (Inpatient).                                                

- Status is Inpatient Admission.                                                              ak1 

- Condition is Stable.                                                                            

- Problem is new.                                                                                 

- Symptoms have improved.                                                                         

UTI on Admission? No                                                                              

                                                                                                  

                                                                                                  

                                                                                                  

Addendum:                                                                                         

2019                                                                                        

     03:30 Co-signature as Attending Physician, Jethro Kaur MD.                                  g
s

                                                                                                  

Signatures:                                                                                       

Dispatcher [employer]                           EDMS                                                 

Aimee Kapoor RN RN                                                      

Kimberli Pedroza RN RN   ak1                                                  

aMgy Bustamante RN RN                                                      

Ede Key, NP                    NP   pm1                                                  

Jethro Kaur MD MD                                                      

                                                                                                  

Corrections: (The following items were deleted from the chart)                                    

                                                                                             

01:26 00:29 Hospitalization Ordered by Abdi Elizabeth MD for Inpatient Admission. Preliminary  cg  

      diagnosis is Atrial fibrillation and flutter - New onset. Bed requested for                 

      Telemetry/MedSurg (Inpatient). Status is Inpatient Admission. Condition is Stable.          

      Problem is new. Symptoms have improved. UTI on Admission? No. pm1                           

02:16 01:26 2019 00:29 Hospitalization Ordered by Abdi Elizabeth MD for Inpatient        ak1 

      Admission. Preliminary diagnosis is Atrial fibrillation and flutter - New onset. Bed        

      requested for Telemetry/MedSurg (Inpatient). Status is Inpatient Admission. Condition       

      is Stable. Problem is new. Symptoms have improved. UTI on Admission? No. cg                 

                                                                                                  

**************************************************************************************************

## 2019-08-12 NOTE — ECHO
HEIGHT: 6 ft 4 in   WEIGHT: 228 lb 8 oz   DATE OF STUDY: 08/12/2019   REFER DR: Abdi Elizabeth MD

2-DIMENSIONAL: YES

     M.MODE: YES

 DOPPLER: YES

COLOR FLOW: YES



                    TDS:  

PORTABLE: 

 DEFINITY:  

BUBBLE STUDY: 





DIAGNOSIS:  ATRIAL FIBRILLATION



CARDIAC HISTORY:  

CATHERIZATION: YES

SURGERY: NO

PROSTHETIC VALVE: NO

PACEMAKER: NO





MEASUREMENTS (cm)

    DIASTOLIC (NORMALS)             SYSTOLIC (NORMALS)

IVSd                 1.1 (0.6-1.2)                    LA Diam 3.2 (1.9-4.0)     LVEF       
  53%  

LVIDd               3.9 (3.5-5.7)                        LVIDs      2.9 (2.0-3.5)     %FS  
        27%

LVPWd             1.3 (0.6-1.2)

Ao Diam           3.2 (2.0-3.7)



2 DIMENSIONAL ASSESSMENT:

RIGHT ATRIUM:                   NORMAL

LEFT ATRIUM:       NORMAL



RIGHT VENTRICLE:            NORMAL

LEFT VENTRICLE: NORMAL



TRICUSPID VALVE:             NORMAL

MITRAL VALVE:     NORMAL



PULMONIC VALVE:             NORMAL

AORTIC VALVE:     NORMAL



PERICARDIAL EFFUSION: NONE

AORTIC ROOT:      NORMAL





LEFT VENTRICULAR WALL MOTION:     NORMAL



DOPPLER/COLOR FLOW:     NORMAL



COMMENTS:      NORMAL 2-DIMENSIONAL ECHOCARDIOGRAM WITH DOPPLER.



TECHNOLOGIST:   FELIX MONTE

## 2019-08-12 NOTE — EKG
Test Date:    2019-08-11               Test Time:    23:08:28

Technician:   SBS                                    

                                                     

MEASUREMENT RESULTS:                                       

Intervals:                                           

Rate:         117                                    

NH:                                                  

QRSD:         88                                     

QT:           322                                    

QTc:          449                                    

Axis:                                                

P:                                                   

NH:                                                  

QRS:          25                                     

T:            36                                     

                                                     

INTERPRETIVE STATEMENTS:                                       

                                                     

Atrial fibrillation with rapid ventricular response

Nonspecific ST abnormality, probably digitalis effect

Abnormal ECG

Compared to ECG 08/29/2018 13:19:02

ST (T wave) deviation now present

Sinus rhythm no longer present



Electronically Signed On 08-12-19 07:35:31 CDT by Zaheer Awad

## 2019-08-12 NOTE — P.HP
Certification for Inpatient


Patient admitted to: Observation


With expected LOS: <2 Midnights


Patient will require the following post-hospital care: None


Practitioner: I am a practitioner with admitting privileges, knowledge of 

patient current condition, hospital course, and medical plan of care.


Services: Services provided to patient in accordance with Admission 

requirements found in Title 42 Section 412.3 of the Code of Federal Regulations





Patient History


Date of Service: 08/12/19


Reason for admission: Atrial fibrillation with rapid ventricular response


History of Present Illness: 





Patient is a 63-year-old gentleman who came into the hospital with atrial 

fibrillation.  Patient has been having palpitations and chest discomfort since 

this morning.  It has been waxing and waning.  He finally decided to come into 

the emergency room for evaluation.  In the ER, he was found the be in atrial 

fibrillation with RVR with a rate in the 120s.  The he was started on IV 

Lopressor.  His rate was better controlled.  He was given  subcutaneous 

Lovenox.  Will get an echocardiogram and a cardiology consultation.  Patient 

did have a stress test a few years ago at the local cardiology office.  We will 

await cardiology review prior to ordering any further diagnostic studies.


Allergies





No Known Allergies Allergy (Verified 10/08/12 16:25)


 





Home Medications: 








Alprazolam [Xanax] 0.5 mg PO PRN PRN 10/09/12 


Amlodipine Besylate 10 mg PO DAILY 10/09/12 


Esomeprazole Magnesium [Nexium] 40 mg PO BID 10/09/12 


Fenofibrate Nanocrystallized [Tricor] 145 mg PO DAILY 10/09/12 


Metoprolol Succinate 50 mg PO DAILY 10/09/12 


Multivitamin [Multi-Vitamin Daily] 1 each PO DAILY 10/09/12 


Omega-3 Fatty Acids [Fish Oil] 300 mg PO BEDTIME 01/31/15 


Amitrip HCl/Chlordiazepoxide [Limbitrol Tablet] 1 each PO BEDTIME 08/12/19 


Cranberry Fruit Extract [Cranberry] 500 mg PO BID 08/12/19 


Mesalamine 1.2 gm PO BID 08/12/19 


Polyethylene Glycol 3350 [Miralax] 17 gm PO DAILYPRN PRN 08/12/19 


Ranitidine [Zantac] 150 mg PO DAILY 08/12/19 








- Past Medical/Surgical History


Has patient received pneumonia vaccine in the past: No


Diabetic: No


-: htn


-: hyperlipedimia


-: repair of lacerated middle left finger


-: right inguinal repair


-: green light laser surgeryto prostate.





- Family History


  ** Father


Family History: Reviewed- Non-Contributory





- Social History


Smoking Status: Never smoker


Alcohol use: No


CD- Drugs: No


Caffeine use: Yes


Place of Residence: Home





Review of Systems


10-point ROS is otherwise unremarkable





Physical Examination





- Vital Signs


Temperature: 98 F


Blood Pressure: 110/70


Pulse: 75


Respirations: 16


Pulse Ox (%): 95





- Physical Exam


General: Alert, In no apparent distress, Oriented x3


HEENT: Atraumatic, PERRLA, Mucous membr. moist/pink, EOMI, Sclerae nonicteric


Neck: Supple, 2+ carotid pulse no bruit, No LAD, Without JVD or thyroid 

abnormality


Respiratory: Clear to auscultation bilaterally, Normal air movement


Cardiovascular: Irregular heart rate/rhythm


Gastrointestinal: Normal bowel sounds, Soft and benign, Non-distended, No 

tenderness


Musculoskeletal: No clubbing, No swelling, No tenderness


Integumentary: No rashes


Neurological: Normal gait, Normal speech, Normal strength at 5/5 x4 extr, 

Normal tone, Sensation intact, Cranial nerves 3-12 intact, Normal affect


Lymphatics: No axilla or inguinal lymphadenopathy





- Studies


Laboratory Data (last 24 hrs)





08/11/19 23:14: PT 11.6, INR 0.98


08/11/19 23:14: WBC 8.8, Hgb 14.0, Hct 41.1, Plt Count 279


08/11/19 23:14: Sodium 141, Potassium 3.5, BUN 13, Creatinine 1.21, Glucose 105

, Magnesium 2.0, Total Bilirubin 0.2, AST 30, ALT 36, Alkaline Phosphatase 70








Assessment & Plan





- Problems (Diagnosis)


(1) Atrial fibrillation with rapid ventricular response


Current Visit: Yes   Status: Acute   





(2) Chest pain


Current Visit: No   Status: Acute   





(3) Hyperlipidemia


Current Visit: No   Status: Chronic   





(4) Hypertension


Current Visit: No   Status: Chronic   





- Plan





1. Serial troponins and EKG


2. Cardiology consultation


3. Echocardiogram 


4. Anti-platelet therapy, anti-coagulation, beta-blocker, statin, and O2 as 

needed


5. IV morphine for pain


6. Lipid profile


7. GI and DVT prophylaxis





Discharge Plan: Home


Plan to discharge in: 24 Hours





- Advance Directives


Does patient have a Living Will: Yes


Does patient have a Durable POA for Healthcare: Yes





- Code Status/Comfort Care


Code Status Assessed: Yes


Code Status: Full Code


Critical Care: No


Time Spent Managing PTS Care (In Minutes): 45

## 2019-08-12 NOTE — CON
Identifying Data:  A 63-year-old man.



Chief Complaint:  Heart racing.



Diagnosis:  Paroxysmal atrial fibrillation.



History Of Present Illness:  Mr. Álvarez says he had atrial fibrillation in 1982.  No recurrences since 
then.  He has been on metoprolol, TriCor, aspirin.  He does not have diabetes.  He does not use tobac
co.  He has never had myocardial infarction or any vascular disease.



Medications:  Outpatient medications also include amlodipine, Nexium, fish oil pills, Limbitrol, Zant
ac, mesalamine, and cranberry fruit extract.



Physical Examination:

General:  He is 6 feet 4 inches, 228 pounds, body mass index 27.8. 

HEENT:  Normal. 

Lungs:  Clear. 

Cardiac:  Normal. 

Abdomen:  Soft. 

Extremities:  Normal pulses. 



EKG shows atrial fibrillation.  We need to get an EKG today.  I would recommend that he start taking 
Xarelto 20 mg a day at supper and stop taking his metoprolol and start taking Betapace 80 mg twice a 
day.  If he is discharge later today, he can do his echocardiogram and a 

nuclear stress test as an outpatient if he wishes.  Echocardiogram is already scheduled at this point
.





ODELL

DD:  08/12/2019 07:49:03Voice ID:  [id number]

DT:  08/12/2019 11:01:01Report ID:  [id number]

## 2019-08-12 NOTE — ER
Nurse's Notes                                                                                     

 [facility]                                                                 

Name: Lee Álvarez                                                                                  

Age: 63 yrs                                                                                       

Sex: Male                                                                                         

: 1955                                                                                   

MRN: [id number]                                                                                   

Arrival Date: 2019                                                                          

Time: 22:52                                                                                       

Account#: [id number]                                                                            

Bed 2                                                                                             

Private MD:                                                                                       

Diagnosis: Atrial fibrillation and flutter-New onset                                              

                                                                                                  

Presentation:                                                                                     

                                                                                             

23:08 Presenting complaint: Patient states: walking up stairs about 30 minutes when patient   ss  

      reports sudden onset of irregular palpitations. Denies CP/ SOB. Transition of care:         

      patient was not received from another setting of care. Onset of symptoms was 2019. Risk Assessment: Do you want to hurt yourself or someone else? Patient reports no     

      desire to harm self or others. Initial Sepsis Screen: Does the patient meet any 2           

      criteria? RR > 20 per min. HR > 90 bpm. Does the patient have a suspected source of         

      infection? No. Patient's initial sepsis screen is negative. Care prior to arrival: None.    

23:08 Method Of Arrival: Ambulatory                                                           ss  

23:08 Acuity: GERRI 2                                                                           ss  

23:10 Note Patient self administered 0.5 mg Xanax and 81 mg ASA x 3.                          ss  

                                                                                                  

Triage Assessment:                                                                                

                                                                                             

01:32 General: Appears uncomfortable. Respiratory: Reports shortness of breath at rest the    ak1 

      patient has moderate shortness of breath.                                                   

01:32 Respiratory: Onset: The symptoms/episode began/occurred at an unknown time.             ak1 

                                                                                                  

Historical:                                                                                       

- Allergies:                                                                                      

                                                                                             

23:10 No Known Allergies;                                                                     ss  

- Home Meds:                                                                                      

23:10 amitriptyline-chlordiazepoxide 25-10 mg Oral tab 1 tab in the evening [Active];         ss  

      amlodipine 10 mg tab 1 tab once daily [Active]; metoprolol succinate 50 mg Oral Tb24 1      

      tab once daily [Active]; esomeprazole magnesium 40 mg Oral cpDR 1 cap 2 times per day       

      [Active]; Multiple Vitamins Oral tab daily [Active]; alprazolam 0.5 mg Oral Tb24 1 tab      

      as needed [Active]; Fish Oil Oral daily [Active]; cranberry Oral twice a day [Active];      

      ranitidine HCl 150 mg Oral tab 1 tab once daily [Active]; Miralax 17 gram Oral pwpk 1       

      packet as needed [Active]; fenofibrate 145 Oral cap 1 cap once daily [Active];              

- PMHx:                                                                                           

23:10 GERD; Hernia; Hypertension;                                                             ss  

- PSHx:                                                                                           

23:10 Hernia repair; prostate surgery;                                                        ss  

                                                                                                  

- Immunization history:: Adult Immunizations up to date.                                          

- Social history:: Smoking status: Patient/guardian denies using tobacco.                         

- Ebola Screening: : Patient denies exposure to infectious person Patient denies travel           

  to an Ebola-affected area in the 21 days before illness onset.                                  

                                                                                                  

                                                                                                  

Screenin/12                                                                                             

00:05 Abuse screen: Denies threats or abuse. Denies injuries from another. Nutritional        ss  

      screening: No deficits noted. Tuberculosis screening: Never had TB. Fall Risk IV access     

      (20 points).                                                                                

                                                                                                  

Assessment:                                                                                       

                                                                                             

22:50 General: Appears in no apparent distress. comfortable, Behavior is cooperative,         ss  

      anxious, Denies fever, feeling ill, fatigue, chills. Pain: Denies pain. Neuro: Level of     

      Consciousness is awake, alert, obeys commands, Oriented to person, place, time,             

      situation. Cardiovascular: Heart tones S1 S2 present Pulses are palpable in right           

      radial artery and left radial artery Edema is absent. Rhythm is atrial fibrillation         

      with rapid ventricular response. Respiratory: Airway is patent Trachea midline              

      Respiratory effort is even, unlabored, Respiratory pattern is regular, symmetrical,         

      Breath sounds are clear bilaterally. GI: Patient currently denies abdominal pain,           

      diarrhea, nausea, vomiting. : No signs and/or symptoms were reported regarding the        

      genitourinary system. EENT: Nares are clear Oral mucosa is moist. Derm: Skin is intact,     

      is healthy with good turgor, Skin is dry, Skin is pink, warm \T\ dry. normal.               

      Musculoskeletal: Circulation, motion, and sensation intact. Capillary refill < 3            

      seconds, is brisk, in bilateral fingers. Range of motion: intact in all extremities,        

      Swelling absent.                                                                            

23:42 Reassessment: Patient appears in no apparent distress at this time. Patient and/or      ss  

      family updated on plan of care and expected duration. Pain level reassessed. Patient is     

      alert, oriented x 3, equal unlabored respirations, skin warm/dry/pink. Cardiovascular:      

      Pulses are palpable in right radial artery and left radial artery Rhythm is atrial          

      fibrillation Chest pain is denied.                                                          

                                                                                             

00:03 Reassessment: Patient appears in no apparent distress at this time. Patient and/or      ss  

      family updated on plan of care and expected duration. Pain level reassessed. Reports        

      fluttering sensation in chest has gone away. Ede Key NP updated on patient's       

      status. Reports not to give 3rd dose of Lopressor at this time. Patient denies pain at      

      this time.                                                                                  

00:18 Reassessment: Patient appears in no apparent distress at this time. PAtient and wife    ss  

      updated on admission status. TSH level added onto labs that have been sent. Patient         

      denies pain at this time.                                                                   

01:33 Reassessment: Patient appears in no apparent distress at this time. Patient and/or      ak1 

      family updated on plan of care and expected duration. Pain level reassessed. Patient is     

      alert, oriented x 3, equal unlabored respirations, skin warm/dry/pink. Patient states       

      feeling better. Patient states symptoms have improved.                                      

                                                                                                  

Vital Signs:                                                                                      

                                                                                             

23:07  / 95; Pulse 130; Resp 22; Temp 98.2(TE); Pulse Ox 98% on R/A; Weight 102.06 kg;    

      Height 6 ft. 4 in. (193.04 cm); Pain 0/10;                                                  

23:41  / 94; Pulse 84; Resp 18; Pulse Ox 99% on R/A; Pain 0/10;                         ss  

                                                                                             

00:01  / 78; Pulse 97; Resp 17; Pulse Ox 99% on R/A; Pain 0/10;                         ss  

00:49  / 75; Pulse 96; Resp 14; Pulse Ox 100% on R/A;                                   ak1 

01:38  / 93; Pulse 86; Resp 20; Temp 98.2; Pulse Ox 96% on R/A;                         ak1 

                                                                                             

23:07 Body Mass Index 27.39 (102.06 kg, 193.04 cm)                                              

                                                                                                  

ED Course:                                                                                        

                                                                                             

22:52 Patient arrived in ED.                                                                  ds1 

22:55 Ede Key, CATA is PHCP.                                                           pm1 

22:55 Jethro Kaur MD is Attending Physician.                                              pm1 

23:07 Arm band placed on right wrist.                                                         ss  

23:08 Triage completed.                                                                       ss  

23:13 Initial lab(s) drawn, by me, sent to lab. Inserted saline lock: 20 gauge in right       lt1 

      antecubital area, using aseptic technique.                                                  

23:18 Aimee Kapoor RN is Primary Nurse.                                                    ss  

23:19 X-ray completed. Portable x-ray completed in exam room. Patient tolerated procedure     kw  

      well.                                                                                       

23:20 XRAY Chest (1 view) In Process Unspecified.                                             EDMS

08                                                                                             

00:29 Abdi Elizabeth MD is Hospitalizing Provider.                                           pm1 

00:49 Patient has correct armband on for positive identification. Placed in gown. Bed in low  ak1 

      position. Call light in reach. Side rails up X 1. Adult w/ patient. Cardiac monitor on.     

      Pulse ox on. NIBP on.                                                                       

00:50 No provider procedures requiring assistance completed. Patient admitted, IV remains in  ak1 

      place.                                                                                      

                                                                                                  

Administered Medications:                                                                         

                                                                                             

23:18 Drug: Lopressor 5 mg Route: IVP; Site: right antecubital;                                 

23:41 Drug: Lopressor 5 mg Route: IVP; Site: right antecubital;                                 

                                                                                             

00:12 Follow up: Response: No adverse reaction; No adverse reaction, pulse is lowered.          

                                                                                             

23:41 Drug: NS 0.9% 1000 ml Route: IV; Rate: 1000 ml; Site: right antecubital;                  

                                                                                             

00:50 Follow up: IV Status: Completed infusion; IV Intake: 1000ml                             ak1 

01:32 Follow up: IV Status: Completed infusion; IV Intake: 1000ml                             ak1 

00:18 Drug: Lovenox 1 mg/kg Route: Sub-Q; Site: right lower abdomen;                          ss  

00:33 Follow up: Response: No adverse reaction                                                ss  

00:34 Drug: Lopressor 25 mg Route: PO;                                                        ss  

00:50 Follow up: Response: No adverse reaction                                                ak1 

                                                                                                  

                                                                                                  

Intake:                                                                                           

00:50 IV: 1000ml; Total: 1000ml.                                                              ak1 

01:32 IV: 1000ml; Total: 2000ml.                                                              ak1 

                                                                                                  

Outcome:                                                                                          

00:29 Decision to Hospitalize by Provider.                                                    pm1 

01:32 Condition: stable                                                                       ak1 

01:32 Instructed on the need for admit.                                                           

02:16 Patient left the ED.                                                                    ak1 

02:16 Admitted to Med/surg accompanied by nurse, family with patient, via wheelchair, room    ak1 

      424, with chart, Report called to  Aye BILLS                                                

                                                                                                  

Signatures:                                                                                       

Dispatcher [employer]                           EDMS                                                 

CheryGrisel                                ds1                                                  

Aimee Kapoor RN                      RN   Lachelle Ibarra Amber RN                       RN   ak1                                                  

Ede Key, NP                    NP   pm1                                                  

Zora Baum                                   lt1                                                  

                                                                                                  

**************************************************************************************************

## 2019-08-13 VITALS — OXYGEN SATURATION: 95 %

## 2019-08-13 RX ADMIN — Medication SCH: at 21:00

## 2019-08-13 RX ADMIN — Medication SCH ML: at 08:03

## 2019-08-13 RX ADMIN — FENOFIBRATE SCH MG: 160 TABLET ORAL at 08:03

## 2019-08-13 RX ADMIN — Medication SCH ML: at 21:02

## 2019-08-13 RX ADMIN — RIVAROXABAN SCH MG: 20 TABLET, FILM COATED ORAL at 16:34

## 2019-08-13 RX ADMIN — Medication SCH: at 20:59

## 2019-08-13 RX ADMIN — AMLODIPINE BESYLATE SCH MG: 10 TABLET ORAL at 08:03

## 2019-08-13 RX ADMIN — SOTALOL HYDROCHLORIDE SCH MG: 80 TABLET ORAL at 06:04

## 2019-08-13 RX ADMIN — PANTOPRAZOLE SODIUM SCH MG: 40 TABLET, DELAYED RELEASE ORAL at 16:34

## 2019-08-13 RX ADMIN — MULTIVITAMIN TABLET SCH TAB: TABLET at 08:02

## 2019-08-13 RX ADMIN — ASPIRIN SCH MG: 81 TABLET, COATED ORAL at 08:02

## 2019-08-13 RX ADMIN — PANTOPRAZOLE SODIUM SCH MG: 40 TABLET, DELAYED RELEASE ORAL at 08:02

## 2019-08-13 RX ADMIN — SOTALOL HYDROCHLORIDE SCH MG: 80 TABLET ORAL at 17:33

## 2019-08-13 RX ADMIN — Medication SCH: at 06:48

## 2019-08-13 NOTE — P.PN
Subjective


Date of Service: 08/13/19


Chief Complaint: Atrial fibrillation with rapid ventricular response





Patient seen and examined at bedside. chart reviewed and case discussed with 

nursing staff. 


Continues to be in A-fib, rate controlled. 


Improved symptoms. 





Review of Systems


10-point ROS is otherwise unremarkable





Physical Examination





- Vital Signs


Temperature: 97.6 F


Blood Pressure: 117/73


Pulse: 91


Respirations: 18


Pulse Ox (%): 96





- Physical Exam


General: Alert, In no apparent distress, Oriented x3


HEENT: Atraumatic, PERRLA, EOMI


Neck: Supple, JVD not distended


Respiratory: Clear to auscultation bilaterally, Normal air movement


Cardiovascular: Normal S1 S2, Irregular heart rate/rhythm


Gastrointestinal: Normal bowel sounds, No tenderness


Musculoskeletal: No tenderness


Integumentary: No rashes


Neurological: Normal speech, Normal tone, Normal affect


Lymphatics: No axilla or inguinal lymphadenopathy





Assessment And Plan





- Current Problems (Diagnosis)


(1) Atrial fibrillation with rapid ventricular response


Current Visit: Yes   Status: Acute   





(2) Anxiety


Current Visit: No   Status: Chronic   





(3) Hyperlipidemia


Current Visit: No   Status: Chronic   





(4) Hypertension


Current Visit: No   Status: Chronic   





- Plan





1. Troponins negative x 3


2. Cardiology consultation, recommendations appreciated


3. Echocardiogram Normal, EF 53%


4. Continue sotalol BID.


5. IV morphine for pain


6. Lipid profile


7. GI and DVT prophylaxis





Disposition: Pending 3rd dose of sotalol this evening. Monitor overnight, 

likely discharge home on sotalol tomorrow. He will require outpatient follow up 

with cardiology. 


Discharge Plan: Home


Plan to discharge in: 24 Hours

## 2019-08-13 NOTE — PN
Date of Progress Note:  08/13/2019



The patient was admitted on 08/12/2019 with new onset atrial fibrillation that is at least 48 hours o
ld.  He had a normal echocardiogram.  He started sotalol 80 mg b.i.d., has received 2 dosages so far.
  He remains in atrial fibrillation with rate controlled.  He is also on Xarelto.  I would wait until
 he gets the third dose.  His QT is not prolonged.  He can go home today and we will see him in the o
ffice soon.  I will set him up for a cardioversion in the next 2-3 weeks.





NB/RONNELL

DD:  08/13/2019 17:43:51Voice ID:  [id number]

DT:  08/13/2019 22:40:56Report ID:  [id number]

## 2019-08-14 VITALS — SYSTOLIC BLOOD PRESSURE: 126 MMHG | TEMPERATURE: 97.9 F | DIASTOLIC BLOOD PRESSURE: 75 MMHG

## 2019-08-14 RX ADMIN — PANTOPRAZOLE SODIUM SCH MG: 40 TABLET, DELAYED RELEASE ORAL at 09:26

## 2019-08-14 RX ADMIN — FENOFIBRATE SCH MG: 160 TABLET ORAL at 09:27

## 2019-08-14 RX ADMIN — MULTIVITAMIN TABLET SCH TAB: TABLET at 09:27

## 2019-08-14 RX ADMIN — AMLODIPINE BESYLATE SCH MG: 10 TABLET ORAL at 09:27

## 2019-08-14 RX ADMIN — SOTALOL HYDROCHLORIDE SCH MG: 80 TABLET ORAL at 05:28

## 2019-08-14 RX ADMIN — Medication SCH: at 09:00

## 2019-08-14 RX ADMIN — Medication SCH ML: at 09:27

## 2019-08-14 RX ADMIN — ASPIRIN SCH MG: 81 TABLET, COATED ORAL at 09:27

## 2019-08-14 NOTE — P.DS
Admission Date: 08/13/19


Discharge Date: 08/14/19


Disposition: ROUTINE DISCHARGE


Discharge Condition: GOOD


Reason for Admission: Atrial fibrillation with rapid ventricular response


Consultations: 





Cardiology





- Problems


(1) Atrial fibrillation with rapid ventricular response


Current Visit: Yes   Status: Acute   





(2) Anxiety


Current Visit: No   Status: Chronic   





(3) Hyperlipidemia


Current Visit: No   Status: Chronic   





(4) Hypertension


Current Visit: No   Status: Chronic   


Brief History of Present Illness: 


Patient is a 63-year-old gentleman who came into the hospital with atrial 

fibrillation.  Patient has been having palpitations and chest discomfort since 

this morning.  It has been waxing and waning.  He finally decided to come into 

the emergency room for evaluation.  In the ER, he was found the be in atrial 

fibrillation with RVR with a rate in the 120s.  The he was started on IV 

Lopressor.  His rate was better controlled.  He was given  subcutaneous 

Lovenox.  Will get an echocardiogram and a cardiology consultation.  Patient 

did have a stress test a few years ago at the local cardiology office.  We will 

await cardiology review prior to ordering any further diagnostic studies.


Hospital Course: 


Patient was admitted for factor fibrillation, new onset.  His troponins 

remained negative x3.  Cardiology was consulted.  He was started on sotalol, he 

did receive 3 doses of sotalol in the hospital.  She tolerated it well.  His 

rate was controlled, though he continued to be in atrial fibrillation.  

Echocardiogram was normal with ejection fraction of 53%.  Per cardiology, they 

would like to see him in clinic in 2-3 weeks.  If at that time he continues to 

min atrial fibrillation, they will consider cardioversion in the clinic.  He 

was then cleared by Cardiology for discharge.  He otherwise remained stable 

throughout the stay.  He does have a history of anxiety, therefore he did have 

a lot of questions.  Does discussed extensively with patient and wife at 

bedside.  All questions were extensively answered.  His diagnosis and treatment 

plan were explained to him in detail.  The patient wife verbalized 

understanding to all the questions.  He was then discharged home in a safe and 

stable manner with recommendations to follow up outpatient with Cardiology.  He 

was discharged on sotalol and Xarelto.





Vital Signs/Physical Exam: 














Temp Pulse Resp BP Pulse Ox


 


 97.9 F   87   14   126/75   95 


 


 08/14/19 08:00  08/14/19 09:27  08/14/19 08:00  08/14/19 09:27  08/14/19 08:00








General: Alert, In no apparent distress, Oriented x3


HEENT: Atraumatic, PERRLA, EOMI


Neck: Supple, JVD not distended


Respiratory: Clear to auscultation bilaterally, Normal air movement


Cardiovascular: Normal S1 S2, Irregular heart rate/rhythm (Rate controlled, 

irregular rhythm)


Gastrointestinal: Normal bowel sounds, No tenderness


Musculoskeletal: No tenderness


Integumentary: No rashes


Neurological: Normal speech, Normal tone, Normal affect


Lymphatics: No axilla or inguinal lymphadenopathy


Laboratory Data at Discharge: 














WBC  8.4 K/uL (4.3-10.9)   08/12/19  05:52    


 


Hgb  13.2 g/dL (13.6-17.9)  L  08/12/19  05:52    


 


Hct  37.7 % (39.6-49.0)  L  08/12/19  05:52    


 


Plt Count  285 K/uL (152-406)   08/12/19  05:52    


 


PT  11.6 SECONDS (9.5-12.5)   08/11/19  23:14    


 


INR  0.98   08/11/19  23:14    


 


Sodium  141 mmol/L (136-145)   08/11/19  23:14    


 


Potassium  3.5 mmol/L (3.5-5.1)   08/11/19  23:14    


 


BUN  13 mg/dL (7-18)   08/11/19  23:14    


 


Creatinine  1.21 mg/dL (0.55-1.3)   08/11/19  23:14    


 


Glucose  105 mg/dL ()   08/11/19  23:14    


 


Magnesium  2.1 mg/dL (1.8-2.4)   08/12/19  05:52    


 


Total Bilirubin  0.2 mg/dL (0.2-1.0)   08/11/19  23:14    


 


AST  30 U/L (15-37)   08/11/19  23:14    


 


ALT  36 U/L (12-78)   08/11/19  23:14    


 


Alkaline Phosphatase  70 U/L ()   08/11/19  23:14    


 


Troponin I  < 0.02 ng/mL (0.0-0.045)   08/13/19  06:04    


 


Triglycerides  225 mg/dL (<150)  H  08/12/19  05:52    


 


Cholesterol  173 mg/dL (<200)   08/12/19  05:52    


 


HDL Cholesterol  43 mg/dL (40-60)   08/12/19  05:52    


 


Cholesterol/HDL Ratio  4.02   08/12/19  05:52    








Home Medications: 








Alprazolam [Xanax] 0.5 mg PO PRN PRN 10/09/12 


Amlodipine Besylate 10 mg PO DAILY 10/09/12 


Esomeprazole Magnesium [Nexium] 40 mg PO BID 10/09/12 


Fenofibrate Nanocrystallized [Tricor] 145 mg PO DAILY 10/09/12 


Multivitamin [Multi-Vitamin Daily] 1 each PO DAILY 10/09/12 


Omega-3 Fatty Acids [Fish Oil] 300 mg PO BEDTIME 01/31/15 


Amitrip HCl/Chlordiazepoxide [Limbitrol Tablet] 1 each PO BEDTIME 08/12/19 


Cranberry Fruit Extract [Cranberry] 500 mg PO BID 08/12/19 


Mesalamine 1.2 gm PO BID 08/12/19 


Polyethylene Glycol 3350 [Miralax] 17 gm PO DAILYPRN PRN 08/12/19 


Ranitidine [Zantac*] 150 mg PO DAILY 08/12/19 


Rivaroxaban [Xarelto] 20 mg PO DAILY #90 tablet 08/14/19 


Sotalol HCl [Betapace*] 80 mg PO BID 6AM 6PM 60 Days  tab 08/14/19 





New Medications: 


Rivaroxaban [Xarelto] 20 mg PO DAILY #90 tablet


Sotalol HCl [Betapace*] 80 mg PO BID 6AM 6PM 60 Days  tab


Patient Discharge Instructions: Follow up with the primary care physician in 2-

3 days.  Please follow up with cardiology in 1-2 weeks.  Information provided 

to you.  Please return to the emergency room for worsening symptoms.


Diet: AHA


Activity: Ad leanne


Followup: 


Miles Duran MD [ACTIVE - CAN ADMIT] - 


Rogerio Coronel MD [ACTIVE - CAN ADMIT] - 1-2 Weeks


Time spent managing pt's care (in minutes): 65

## 2019-08-23 ENCOUNTER — HOSPITAL ENCOUNTER (EMERGENCY)
Dept: HOSPITAL 97 - ER | Age: 64
Discharge: HOME | End: 2019-08-23
Payer: COMMERCIAL

## 2019-08-23 VITALS — TEMPERATURE: 98 F

## 2019-08-23 VITALS — OXYGEN SATURATION: 100 %

## 2019-08-23 VITALS — DIASTOLIC BLOOD PRESSURE: 60 MMHG | SYSTOLIC BLOOD PRESSURE: 102 MMHG

## 2019-08-23 DIAGNOSIS — K21.9: ICD-10-CM

## 2019-08-23 DIAGNOSIS — I48.91: Primary | ICD-10-CM

## 2019-08-23 DIAGNOSIS — I48.92: ICD-10-CM

## 2019-08-23 DIAGNOSIS — I10: ICD-10-CM

## 2019-08-23 LAB
ALBUMIN SERPL BCP-MCNC: 3.6 G/DL (ref 3.4–5)
ALP SERPL-CCNC: 50 U/L (ref 45–117)
ALT SERPL W P-5'-P-CCNC: 30 U/L (ref 12–78)
AST SERPL W P-5'-P-CCNC: 25 U/L (ref 15–37)
BUN BLD-MCNC: 15 MG/DL (ref 7–18)
GLUCOSE SERPLBLD-MCNC: 126 MG/DL (ref 74–106)
HCT VFR BLD CALC: 40 % (ref 39.6–49)
INR BLD: 1.35
LYMPHOCYTES # SPEC AUTO: 3.1 K/UL (ref 0.7–4.9)
MAGNESIUM SERPL-MCNC: 2 MG/DL (ref 1.8–2.4)
NT-PROBNP SERPL-MCNC: 939 PG/ML (ref ?–125)
PMV BLD: 9.2 FL (ref 7.6–11.3)
POTASSIUM SERPL-SCNC: 4.1 MMOL/L (ref 3.5–5.1)
RBC # BLD: 4.31 M/UL (ref 4.33–5.43)
TROPONIN (EMERG DEPT USE ONLY): < 0.02 NG/ML (ref 0–0.04)

## 2019-08-23 PROCEDURE — 83735 ASSAY OF MAGNESIUM: CPT

## 2019-08-23 PROCEDURE — 36415 COLL VENOUS BLD VENIPUNCTURE: CPT

## 2019-08-23 PROCEDURE — 80048 BASIC METABOLIC PNL TOTAL CA: CPT

## 2019-08-23 PROCEDURE — 84484 ASSAY OF TROPONIN QUANT: CPT

## 2019-08-23 PROCEDURE — 96374 THER/PROPH/DIAG INJ IV PUSH: CPT

## 2019-08-23 PROCEDURE — 96361 HYDRATE IV INFUSION ADD-ON: CPT

## 2019-08-23 PROCEDURE — 93005 ELECTROCARDIOGRAM TRACING: CPT

## 2019-08-23 PROCEDURE — 85610 PROTHROMBIN TIME: CPT

## 2019-08-23 PROCEDURE — 99285 EMERGENCY DEPT VISIT HI MDM: CPT

## 2019-08-23 PROCEDURE — 80076 HEPATIC FUNCTION PANEL: CPT

## 2019-08-23 PROCEDURE — 83880 ASSAY OF NATRIURETIC PEPTIDE: CPT

## 2019-08-23 PROCEDURE — 71045 X-RAY EXAM CHEST 1 VIEW: CPT

## 2019-08-23 PROCEDURE — 85025 COMPLETE CBC W/AUTO DIFF WBC: CPT

## 2019-08-23 NOTE — ER
Nurse's Notes                                                                                     

 [facility]                                                                 

Name: Lee Álvarez                                                                                  

Age: 64 yrs                                                                                       

Sex: Male                                                                                         

: 1955                                                                                   

MRN: [id number]                                                                                   

Arrival Date: 2019                                                                          

Time: 15:24                                                                                       

Account#: [id number]                                                                            

Bed 25                                                                                            

Private MD:                                                                                       

Diagnosis: Atrial fibrillation and flutter;Chest pain, unspecified                                

                                                                                                  

Presentation:                                                                                     

                                                                                             

15:34 Presenting complaint: Patient states: I AM RECENTLY DIAGNOSED WITH ATRIAL FIB. I AM ON  rv  

      XARELTO AND BETAPACE. I HAVE CHEST PRESSURE AT AROUND 3PM. DENIES PALPITATION AND N/V.      

      Transition of care: patient was not received from another setting of care. Onset of         

      symptoms was 2019 at 15:00. Risk Assessment: Do you want to hurt yourself or     

      someone else? Patient reports no desire to harm self or others. Initial Sepsis Screen:      

      Does the patient meet any 2 criteria? No. Patient's initial sepsis screen is negative.      

      Does the patient have a suspected source of infection? No. Patient's initial sepsis         

      screen is negative. Care prior to arrival: None.                                            

15:34 Method Of Arrival: Ambulatory                                                           rv  

15:34 Acuity: GERRI 3                                                                           rv  

                                                                                                  

Historical:                                                                                       

- Allergies:                                                                                      

15:39 No Known Allergies;                                                                     rv  

- Home Meds:                                                                                      

15:39 alprazolam 0.5 mg Oral Tb24 1 tab as needed [Active]; amitriptyline-chlordiazepoxide    rv  

      25-10 mg Oral tab 1 tab in the evening [Active]; amlodipine 10 mg tab 1 tab once daily      

      [Active]; cranberry Oral twice a day [Active]; esomeprazole magnesium 40 mg Oral cpDR 1     

      cap 2 times per day [Active]; fenofibrate 145 Oral cap 1 cap once daily [Active]; Fish      

      Oil Oral daily [Active]; mesalamine 1.2 gm Oral 2 caps 2 times per day [Active];            

      metoprolol succinate 50 mg Oral Tb24 1 tab once daily [Active]; Miralax 17 gram Oral        

      pwpk 1 packet as needed [Active]; Multiple Vitamins Oral tab daily [Active]; ranitidine     

      HCl 150 mg Oral tab 1 tab once daily [Active]; Betapace 80 mg oral tab 1 tab 3 times        

      per day [Active]; Xarelto 20 mg oral tab 1 tab once daily [Active];                         

- PMHx:                                                                                           

15:39 GERD; Hernia; Hypertension; Atrial Fib;                                                 rv  

- PSHx:                                                                                           

15:39 TOE SURGERY; RIGHT INGUINAL HERNIA REPAIR; PROSTATE PROCEDURE; Tonsillectomy;           rv  

                                                                                                  

- Immunization history:: Adult Immunizations up to date.                                          

- Social history:: Smoking status: Patient/guardian denies using tobacco, never smoked.           

- Ebola Screening: : No symptoms or risks identified at this time.                                

                                                                                                  

                                                                                                  

Screening:                                                                                        

15:40 Abuse screen: Denies threats or abuse. Denies injuries from another. Nutritional        mg2 

      screening: No deficits noted. Tuberculosis screening: No symptoms or risk factors           

      identified. Fall Risk IV access (20 points).                                                

                                                                                                  

Assessment:                                                                                       

15:40 General: Appears in no apparent distress. comfortable, Behavior is calm, cooperative.   rv  

      Pain: Complains of pain in chest. Pain: Pain radiates to BOTH SHOULDERS Pain began          

      suddenly, 30 min ago. Neuro: Level of Consciousness is awake, alert, obeys commands,        

      Oriented to person, place, time, situation. Cardiovascular: Patient's skin is warm and      

      dry. Rhythm is atrial fibrillation with rapid ventricular response. Respiratory: Airway     

      is patent. GI: No signs and/or symptoms were reported involving the gastrointestinal        

      system. : No signs and/or symptoms were reported regarding the genitourinary system.      

      EENT: No signs and/or symptoms were reported regarding the EENT system. Derm: Skin is       

      intact.                                                                                     

                                                                                                  

Vital Signs:                                                                                      

15:35  / 84; Pulse 126; Resp 12; Temp 98; Pulse Ox 96% on R/A;                          mg2 

15:45  / 60; Pulse 122; Resp 18; Pulse Ox 100% on R/A;                                  mg2 

16:10  / 79; Pulse 102; Resp 18; Pulse Ox 100% on R/A;                                  mg2 

17:11  / 67; Pulse 112; Resp 18; Pulse Ox 100% on R/A;                                  mg2 

18:07  / 60; Pulse 102; Resp 18; Pulse Ox 100% on R/A;                                  mg2 

                                                                                                  

ED Course:                                                                                        

15:24 Patient arrived in ED.                                                                  as  

15:27 Ede Key NP is PHCP.                                                           pm1 

15:27 Zach Pacheco MD is Attending Physician.                                             pm1 

15:27 Pa Aguila RN is Primary Nurse.                                                  mg2 

15:35 Triage completed.                                                                       rv  

15:40 Arm band placed on.                                                                     mg2 

15:40 No provider procedures requiring assistance completed. Inserted saline lock: 20 gauge   mg2 

      in left forearm, using aseptic technique. Blood collected. Patient maintains SpO2           

      saturation greater than 95% on room air.                                                    

15:41 Patient has correct armband on for positive identification. Cardiac monitor on. Pulse   mg2 

      ox on. NIBP on.                                                                             

16:01 X-ray completed. Portable x-ray completed in exam room. Patient tolerated procedure     mh1 

      well.                                                                                       

16:01 XRAY Chest (1 view) In Process Unspecified.                                             EDMS

18:27 Rogerio Coronel MD is Referral Physician.                                               pm1 

18:40 IV discontinued, intact, bleeding controlled, No redness/swelling at site. Pressure     mg2 

      dressing applied.                                                                           

                                                                                                  

Administered Medications:                                                                         

15:50 Drug: Lopressor 5 mg Route: IVP; Site: left forearm;                                    mg2 

15:50 Drug: NS 0.9% 1000 ml Route: IV; Rate: 1000 ml; Site: left forearm;                     mg2 

17:10 Follow up: Response: No adverse reaction; IV Status: Completed infusion; IV Intake:     mg2 

      1000ml                                                                                      

16:13 Drug: Lopressor 5 mg Route: IVP; Site: left antecubital;                                mg2 

17:10 Drug: Lopressor 5 mg Route: IVP; Site: left forearm;                                    mg2 

18:02 Follow up: Response: No adverse reaction; Blood pressure is lowered; hr decreased       mg2 

18:07 Drug: Betapace 80 mg Route: PO;                                                         mg2 

18:31 Follow up: Response: No adverse reaction                                                mg2 

18:36 Drug: Xarelto 20 mg Route: PO;                                                          mg2 

18:36 Follow up: Response: No adverse reaction; Medication administered at discharge.         mg2 

                                                                                                  

                                                                                                  

Intake:                                                                                           

17:10 IV: 1000ml; Total: 1000ml.                                                              mg2 

                                                                                                  

Outcome:                                                                                          

18:26 Discharge ordered by MD.                                                                pm1 

18:40 Discharged to home ambulatory, with family.                                             mg2 

18:40 Discharge instructions given to patient, family, Instructed on discharge instructions,      

      follow up and referral plans. Demonstrated understanding of instructions, follow-up         

      care.                                                                                       

18:40 Condition: good                                                                         mg2 

18:52 Patient left the ED.                                                                    mg2 

                                                                                                  

Signatures:                                                                                       

Dispatcher [employer]                           EDMS                                                 

Carole Strauss                               1                                                  

[place] Bob Patrick, NP                    NP   pm1                                                  

Pa Aguila, NEGAR                    RN   mg2                                                  

Ryan Franco RN                    RN   rv                                                   

                                                                                                  

Corrections: (The following items were deleted from the chart)                                    

15:39 15:35  / 84; Pulse 126bpm; Resp 12bpm; Pulse Ox 96% RA; rv                        mg2 

                                                                                                  

**************************************************************************************************

## 2019-08-23 NOTE — RAD REPORT
EXAM DESCRIPTION:  RAD - Chest Single View - 8/23/2019 4:01 pm

 

CLINICAL HISTORY:  Chest pain

 

COMPARISON:  August 11, 2019

 

TECHNIQUE:  AP portable chest image was obtained 1554 hours .

 

FINDINGS:  Lungs are clear. Heart and vasculature are normal. No measurable pleural effusion and no p
neumothorax. No acute bony abnormality seen. No acute aortic findings suspected.

 

IMPRESSION:  No acute cardiopulmonary process.

 

No significant interval change.

## 2019-08-23 NOTE — EDPHYS
Physician Documentation                                                                           

 [facility]                                                                 

Name: Lee Álvarez                                                                                  

Age: 64 yrs                                                                                       

Sex: Male                                                                                         

: 1955                                                                                   

MRN: [id number]                                                                                   

Arrival Date: 2019                                                                          

Time: 15:24                                                                                       

Account#: [id number]                                                                            

Bed 25                                                                                            

Private MD:                                                                                       

ED Physician Zach Pacheco                                                                      

HPI:                                                                                              

                                                                                             

15:48 This 64 yrs old  Male presents to ER via Ambulatory with complaints of Chest   pm1 

      Pain, AFib.                                                                                 

15:48 The patient or guardian reports chest pain that is located primarily in the mid-sternal pm1 

      area. Onset: at 15:00. The pain does not radiate. Associated signs and symptoms:            

      Pertinent negatives: abdominal pain, cough, nausea, palpitations, shortness of breath,      

      vomiting. The chest pain is described as a pressure. Duration: The patient or guardian      

      reports a single episode, that is now resolved. Modifying factors: The symptoms are         

      alleviated by nothing. the symptoms are aggravated by nothing. Severity of pain: in the     

      emergency department the pain has resolved. The patient has been recently been admitted     

      at [facility], was discharged a couple of weeks ago, has             

      appointment with Dr. Olmos for cardioversion on Monday.                                  

                                                                                                  

Historical:                                                                                       

- Allergies:                                                                                      

15:39 No Known Allergies;                                                                     rv  

- Home Meds:                                                                                      

15:39 alprazolam 0.5 mg Oral Tb24 1 tab as needed [Active]; amitriptyline-chlordiazepoxide    rv  

      25-10 mg Oral tab 1 tab in the evening [Active]; amlodipine 10 mg tab 1 tab once daily      

      [Active]; cranberry Oral twice a day [Active]; esomeprazole magnesium 40 mg Oral cpDR 1     

      cap 2 times per day [Active]; fenofibrate 145 Oral cap 1 cap once daily [Active]; Fish      

      Oil Oral daily [Active]; mesalamine 1.2 gm Oral 2 caps 2 times per day [Active];            

      metoprolol succinate 50 mg Oral Tb24 1 tab once daily [Active]; Miralax 17 gram Oral        

      pwpk 1 packet as needed [Active]; Multiple Vitamins Oral tab daily [Active]; ranitidine     

      HCl 150 mg Oral tab 1 tab once daily [Active]; Betapace 80 mg oral tab 1 tab 3 times        

      per day [Active]; Xarelto 20 mg oral tab 1 tab once daily [Active];                         

- PMHx:                                                                                           

15:39 GERD; Hernia; Hypertension; Atrial Fib;                                                 rv  

- PSHx:                                                                                           

15:39 TOE SURGERY; RIGHT INGUINAL HERNIA REPAIR; PROSTATE PROCEDURE; Tonsillectomy;           rv  

                                                                                                  

- Immunization history:: Adult Immunizations up to date.                                          

- Social history:: Smoking status: Patient/guardian denies using tobacco, never smoked.           

- Ebola Screening: : No symptoms or risks identified at this time.                                

                                                                                                  

                                                                                                  

ROS:                                                                                              

16:00 Constitutional: Negative for fever, chills, and weight loss, Eyes: Negative for injury, pm1 

      pain, redness, and discharge, ENT: Negative for injury, pain, and discharge, Neck:          

      Negative for injury, pain, and swelling.                                                    

16:00 Respiratory: Negative for shortness of breath, cough, wheezing, and pleuritic chest         

      pain, Abdomen/GI: Negative for abdominal pain, nausea, vomiting, diarrhea, and              

      constipation, Back: Negative for injury and pain, : Negative for injury, bleeding,        

      discharge, and swelling, MS/Extremity: Negative for injury and deformity, Skin:             

      Negative for injury, rash, and discoloration, Neuro: Negative for headache, weakness,       

      numbness, tingling, and seizure.                                                            

16:00 Cardiovascular: Positive for chest pain, Negative for edema, orthopnea, palpitations.       

                                                                                                  

Exam:                                                                                             

16:00 Constitutional:  This is a well developed, well nourished patient who is awake, alert,  pm1 

      and in no acute distress. Head/Face:  Normocephalic, atraumatic. Eyes:  Pupils equal        

      round and reactive to light, extra-ocular motions intact.  Lids and lashes normal.          

      Conjunctiva and sclera are non-icteric and not injected.  Cornea within normal limits.      

      Periorbital areas with no swelling, redness, or edema. ENT:  Nares patent. No nasal         

      discharge, no septal abnormalities noted.  Tympanic membranes are normal and external       

      auditory canals are clear.  Oropharynx with no redness, swelling, or masses, exudates,      

      or evidence of obstruction, uvula midline.  Mucous membranes moist. Neck:  Trachea          

      midline, no thyromegaly or masses palpated, and no cervical lymphadenopathy.  Supple,       

      full range of motion without nuchal rigidity, or vertebral point tenderness.  No            

      Meningismus. Chest/axilla:  Normal chest wall appearance and motion.  Nontender with no     

      deformity.  No lesions are appreciated. Cardiovascular:  Regular rate and rhythm with a     

      normal S1 and S2.  No gallops, murmurs, or rubs.  Normal PMI, no JVD.  No pulse             

      deficits. Respiratory:  Lungs have equal breath sounds bilaterally, clear to                

      auscultation and percussion.  No rales, rhonchi or wheezes noted.  No increased work of     

      breathing, no retractions or nasal flaring. Abdomen/GI:  Soft, non-tender, with normal      

      bowel sounds.  No distension or tympany.  No guarding or rebound.  No evidence of           

      tenderness throughout. Back:  No spinal tenderness.  No costovertebral tenderness.          

      Full range of motion. Skin:  Warm, dry with normal turgor.  Normal color with no            

      rashes, no lesions, and no evidence of cellulitis. MS/ Extremity:  Pulses equal, no         

      cyanosis.  Neurovascular intact.  Full, normal range of motion.                             

16:00 Neuro: Orientation: is normal, Mentation: is normal, Motor: moves all fours, strength       

      is normal, strength is 5/5 in all extremities.                                              

                                                                                                  

Vital Signs:                                                                                      

15:35  / 84; Pulse 126; Resp 12; Temp 98; Pulse Ox 96% on R/A;                          mg2 

15:45  / 60; Pulse 122; Resp 18; Pulse Ox 100% on R/A;                                  mg2 

16:10  / 79; Pulse 102; Resp 18; Pulse Ox 100% on R/A;                                  mg2 

17:11  / 67; Pulse 112; Resp 18; Pulse Ox 100% on R/A;                                  mg2 

18:07  / 60; Pulse 102; Resp 18; Pulse Ox 100% on R/A;                                  mg2 

                                                                                                  

MDM:                                                                                              

15:28 Patient medically screened.                                                             [facility] 

17:35 Data reviewed: vital signs. Data interpreted: Pulse oximetry: on room air is 100 %.     pm1 

      Interpretation: normal.                                                                     

18:25 Counseling: I had a detailed discussion with the patient and/or guardian regarding: the pm1 

      historical points, exam findings, and any diagnostic results supporting the                 

      discharge/admit diagnosis, lab results, radiology results, the need for outpatient          

      follow up, for definitive care, a cardiologist, to return to the emergency department       

      if symptoms worsen or persist or if there are any questions or concerns that arise at       

      home.                                                                                       

                                                                                                  

                                                                                             

15:28 Order name: Basic Metabolic Panel; Complete Time: 16:25                                 mg2 

                                                                                             

15:28 Order name: CBC with Diff; Complete Time: 16:03                                         mg2 

                                                                                             

15:28 Order name: LFT's; Complete Time: 16:25                                                 mg2 

                                                                                             

15:28 Order name: Magnesium; Complete Time: 16:                                             mg2 

                                                                                             

15:28 Order name: NT PRO-BNP; Complete Time: 16:25                                            mg2 

                                                                                             

15:28 Order name: PT-INR; Complete Time: 16:25                                                mg2 

                                                                                             

15:28 Order name: Troponin (emerg Dept Use Only); Complete Time: 16:                        mg2 

                                                                                             

15:28 Order name: XRAY Chest (1 view); Complete Time: 16:25                                   mg2 

                                                                                             

15:28 Order name: EKG; Complete Time: 15:30                                                   mg2 

                                                                                             

15:28 Order name: Cardiac monitoring; Complete Time: 15:36                                    mg2 

                                                                                             

15:28 Order name: EKG - Nurse/Tech; Complete Time: 15:36                                      mg2 

                                                                                             

15:28 Order name: IV Saline Lock; Complete Time: 15:36                                        mg2 

                                                                                             

15:28 Order name: Labs collected and sent; Complete Time: 15:36                               mg2 

                                                                                             

15:28 Order name: O2 Per Protocol; Complete Time: 15:36                                       mg2 

                                                                                             

15:28 Order name: O2 Sat Monitoring; Complete Time: 15:37                                     mg2 

                                                                                                  

ECG:                                                                                              

15:31 Rate is 119 beats/min. Rhythm is regular, A fib with No ectopy. No Q waves. T waves are pm1 

      Normal. No ST changes noted. Clinical impression: Atrial Fibrillation.                      

                                                                                                  

Administered Medications:                                                                         

15:50 Drug: Lopressor 5 mg Route: IVP; Site: left forearm;                                    mg2 

15:50 Drug: NS 0.9% 1000 ml Route: IV; Rate: 1000 ml; Site: left forearm;                     mg2 

17:10 Follow up: Response: No adverse reaction; IV Status: Completed infusion; IV Intake:     mg2 

      1000ml                                                                                      

16:13 Drug: Lopressor 5 mg Route: IVP; Site: left antecubital;                                mg2 

17:10 Drug: Lopressor 5 mg Route: IVP; Site: left forearm;                                    mg2 

18:02 Follow up: Response: No adverse reaction; Blood pressure is lowered; hr decreased       mg2 

18:07 Drug: Betapace 80 mg Route: PO;                                                         mg2 

18:31 Follow up: Response: No adverse reaction                                                mg2 

18:36 Drug: Xarelto 20 mg Route: PO;                                                          mg2 

18:36 Follow up: Response: No adverse reaction; Medication administered at discharge.         mg2 

                                                                                                  

                                                                                                  

Disposition:                                                                                      

                                                                                             

09:16 Co-signature as Attending Physician, Zach Pacheco MD I agree with the assessment and  [facility] 

      plan of care.                                                                               

                                                                                                  

Disposition:                                                                                      

19 18:26 Discharged to Home. Impression: Atrial fibrillation and flutter, Chest pain,       

  unspecified.                                                                                    

- Condition is Stable.                                                                            

- Discharge Instructions: Atrial Fibrillation, Nonspecific Chest Pain.                            

                                                                                                  

- Medication Reconciliation Form, Thank You Letter, Antibiotic Education, Prescription            

  Opioid Use form.                                                                                

- Follow up: Emergency Department; When: As needed; Reason: Worsening of condition.               

  Follow up: Private Physician; When: 2 - 3 days; Reason: Recheck today's complaints,             

  Continuance of care, Re-evaluation by your physician. Follow up: Rogerio Coronel MD;            

  When: 2 - 3 days; Reason: Recheck today's complaints, Continuance of care,                      

  Re-evaluation by your physician.                                                                

- Problem is new.                                                                                 

- Symptoms have improved.                                                                         

                                                                                                  

                                                                                                  

                                                                                                  

Signatures:                                                                                       

Dispatcher [employer]                           EDMS                                                 

Zach Pacheco MD MD cha Marinas, Patrick, NP                    NP   pm1                                                  

Pa Aguila, NEGAR                    RN   mg2                                                  

Ryan Franco RN                    RN   rv                                                   

                                                                                                  

Corrections: (The following items were deleted from the chart)                                    

                                                                                             

18:27 18:26 2019 18:26 Discharged to Home. Impression: Atrial fibrillation and flutter; pm1 

      Chest pain, unspecified. Condition is Stable. Forms are Medication Reconciliation Form,     

      Thank You Letter, Antibiotic Education, Prescription Opioid Use. Follow up: Emergency       

      Department; When: As needed; Reason: Worsening of condition. Follow up: Private             

      Physician; When: 2 - 3 days; Reason: Recheck today's complaints, Continuance of care,       

      Re-evaluation by your physician. Problem is new. Symptoms have improved. pm1                

18:52 18:27 2019 18:26 Discharged to Home. Impression: Atrial fibrillation and flutter; mg2 

      Chest pain, unspecified. Condition is Stable. Discharge Instructions: Atrial                

      Fibrillation, Nonspecific Chest Pain. Forms are Medication Reconciliation Form, Thank       

      You Letter, Antibiotic Education, Prescription Opioid Use. Follow up: Emergency             

      Department; When: As needed; Reason: Worsening of condition. Follow up: Private             

      Physician; When: 2 - 3 days; Reason: Recheck today's complaints, Continuance of care,       

      Re-evaluation by your physician. Follow up: Rogerio Coronel; When: 2 - 3 days; Reason:        

      Recheck today's complaints, Continuance of care, Re-evaluation by your physician.           

      Problem is new. Symptoms have improved. pm1                                                 

                                                                                                  

**************************************************************************************************

## 2019-08-24 NOTE — EKG
Test Date:    2019-08-23               Test Time:    15:30:41

Technician:   CARMELINA                                     

                                                     

MEASUREMENT RESULTS:                                       

Intervals:                                           

Rate:         119                                    

KY:                                                  

QRSD:         92                                     

QT:           326                                    

QTc:          458                                    

Axis:                                                

P:                                                   

KY:                                                  

QRS:          80                                     

T:            18                                     

                                                     

INTERPRETIVE STATEMENTS:                                       

                                                     

Atrial fibrillation with rapid ventricular response

Abnormal ECG

Compared to ECG 08/12/2019 10:00:41

No significant changes



Electronically Signed On 08-24-19 15:50:26 CDT by Zaheer Awad

## 2020-04-28 LAB
BUN BLD-MCNC: 18 MG/DL (ref 7–18)
GLUCOSE SERPLBLD-MCNC: 94 MG/DL (ref 74–106)
HCT VFR BLD CALC: 39.8 % (ref 39.6–49)
LYMPHOCYTES # SPEC AUTO: 2.9 K/UL (ref 0.7–4.9)
PMV BLD: 8.9 FL (ref 7.6–11.3)
POTASSIUM SERPL-SCNC: 4 MMOL/L (ref 3.5–5.1)
RBC # BLD: 4.34 M/UL (ref 4.33–5.43)

## 2020-04-28 NOTE — RAD REPORT
EXAM DESCRIPTION:  RAD - Chest Pa And Lat (2 Views) - 4/28/2020 10:18 am

 

CLINICAL HISTORY:  preop, pending left inguinal hernia repair

 

COMPARISON:  Portable August 2019

 

TECHNIQUE:  Frontal and lateral views of the chest were obtained.

 

FINDINGS:  The lungs are clear.  Slight elevation of the left hemidiaphragm noted matching comparison
. Interstitial pattern matches comparison. Heart size is normal and central vasculature is within nor
mal limits.  No pleural effusion or pneumothorax seen.  No acute bony finding noted.  No aortic abnor
mality.

 

IMPRESSION:  No acute cardiopulmonary process.   No significant interval changes.

## 2020-05-05 ENCOUNTER — HOSPITAL ENCOUNTER (OUTPATIENT)
Dept: HOSPITAL 97 - OR | Age: 65
Discharge: HOME | End: 2020-05-05
Attending: SURGERY
Payer: COMMERCIAL

## 2020-05-05 VITALS — SYSTOLIC BLOOD PRESSURE: 117 MMHG | OXYGEN SATURATION: 95 % | DIASTOLIC BLOOD PRESSURE: 68 MMHG | TEMPERATURE: 97.2 F

## 2020-05-05 DIAGNOSIS — K40.90: Primary | ICD-10-CM

## 2020-05-05 DIAGNOSIS — K21.9: ICD-10-CM

## 2020-05-05 DIAGNOSIS — G47.33: ICD-10-CM

## 2020-05-05 DIAGNOSIS — Z11.59: ICD-10-CM

## 2020-05-05 DIAGNOSIS — F51.9: ICD-10-CM

## 2020-05-05 DIAGNOSIS — I48.91: ICD-10-CM

## 2020-05-05 DIAGNOSIS — I10: ICD-10-CM

## 2020-05-05 PROCEDURE — 49505 PRP I/HERN INIT REDUC >5 YR: CPT

## 2020-05-05 PROCEDURE — 80048 BASIC METABOLIC PNL TOTAL CA: CPT

## 2020-05-05 PROCEDURE — 71046 X-RAY EXAM CHEST 2 VIEWS: CPT

## 2020-05-05 PROCEDURE — 88302 TISSUE EXAM BY PATHOLOGIST: CPT

## 2020-05-05 PROCEDURE — 85025 COMPLETE CBC W/AUTO DIFF WBC: CPT

## 2020-05-05 PROCEDURE — 0YU60JZ SUPPLEMENT LEFT INGUINAL REGION WITH SYNTHETIC SUBSTITUTE, OPEN APPROACH: ICD-10-PCS

## 2020-05-05 PROCEDURE — 36415 COLL VENOUS BLD VENIPUNCTURE: CPT

## 2020-05-05 PROCEDURE — 93005 ELECTROCARDIOGRAM TRACING: CPT

## 2020-05-05 RX ADMIN — SODIUM CHLORIDE, SODIUM LACTATE, POTASSIUM CHLORIDE, AND CALCIUM CHLORIDE ONE MLS: .6; .31; .03; .02 INJECTION, SOLUTION INTRAVENOUS at 11:19

## 2020-05-05 RX ADMIN — SODIUM CHLORIDE, SODIUM LACTATE, POTASSIUM CHLORIDE, AND CALCIUM CHLORIDE ONE MLS: .6; .31; .03; .02 INJECTION, SOLUTION INTRAVENOUS at 11:13

## 2020-05-05 NOTE — OP
Date of Procedure:  05/05/2020



Surgeon:  Nishant Younger MD



Assistant:  GARRET Montero



Preoperative Diagnosis:  Left inguinal hernia.



Postoperative Diagnosis:  Left inguinal hernia.



Procedure:  Repair of left inguinal hernia.



Estimated Blood Loss:  Minimal.



Specimen:  Hernia sac and contents.



Findings:  As above.



Anesthesia:  General.



Complications:  None.



Disposition:  Patient tolerated the procedure in stable condition, taken to Recovery in good general 
condition.



Procedure In Detail:  Patient was brought to the OR and placed in supine position.  General anesthesi
a was begun.  Patient was prepped and draped in usual sterile fashion.  Marcaine 0.5% was infiltrated
 locally.  A #15 blade was used to make a 4 cm oblique incision in the left inguinal region.  Subcuta
neous tissue was divided.  Joseph fascia identified and divided.  Aponeurosis identified, mobilized i
nferiorly and opened through the external ring.  Ilioinguinal nerve identified and retracted out of t
he field of dissection.  Then, cord mobilized at the pubic tubercle and skeletonized.  A large indire
ct hernia with sac and cord lipoma attached and was dissected free from the cord structures and follo
w into the internal ring and then high ligation was done with 2-0 Prolene suture ligature and free ha
nd diet.  The Marlex mesh plug was placed in the internal ring, secured with VersaTack stapler.  Onla
y mesh was placed on the inguinal floor, secured medially to the pubic tubercle, superior to the conj
oined tendon, inferior to the shelving edge, laterally to each other.  Cord structures and ilioinguin
al nerve were placed back in anatomic location and 2-0 Prolene used to close the aponeurosis and 3-0 
chromic used to reapproximate the Joseph fascia.  Staples were used to close the skin.  Sterile dress
ing was applied.  Patient was awakened and taken to Recovery in good general condition.





AS/MODL

DD:  05/05/2020 10:49:12Voice ID:  [id number]

DT:  05/05/2020 21:40:10Report ID:  [id number]

## 2020-05-05 NOTE — DS
Date of Discharge:  05/05/2020



Patient will go to Day Surgery and home when stable.



Disposition:  Home.



Condition:  Stable.



Discharge Instructions:  Resume home medications and diet.  Activity as tolerated.  No heavy lifting.
  Remove outer dressing in 2 days.  Shower.  Keep wound clean and dry.  Scrotal support, ice pack as 
ordered.  Follow up in my office in 1 week.  Call for appointment.  Tylenol No. 3 one tablet p.o. q.4
 p.r.n. pain.





AS/MODL

DD:  05/05/2020 10:49:12Voice ID:  [id number]

DT:  05/05/2020 21:47:52Report ID:  [id number]

## 2020-05-05 NOTE — XMS REPORT
Patient Summary Document

                             Created on:May 5, 2020



Patient:SHARIFA MASSEY

Sex:Male

:1955

External Reference #:[id number]





Demographics







                          Address                   [address] [place] [address]

 

                          Home Phone                [phone]

 

                          Email Address             [email or web address]

 

                          Preferred Language        Unknown

 

                          Marital Status            Unknown

 

                          Yazidism Affiliation     Unknown

 

                          Race                      Unknown

 

                          Additional Race(s)        Unavailable

 

                          Ethnic Group              Unknown









Author







                          Organization              [facility]

t

 

                          Address                   [address] [place] [address] [address] [address]

 

                          Phone                     [phone]









Care Team Providers







                    Name                Role                Phone

 

                    DR JUAN           Unavailable         Unavailable









Problems

This patient has no known problems.



Allergies, Adverse Reactions, Alerts

This patient has no known allergies or adverse reactions.



Medications

This patient has no known medications.



Encounters







        Start   End     Encounter Admission Attending Care    Care    Encounter



        Date/Time Date/Time Type    Type    Clinicians Facility Department ID

 

        2018 Outpatient GABRIELE CARRILLO     [facility]    [id number]

[id number]



        04:00:00 07:00:00                 JESSICA

## 2020-07-06 LAB
BUN BLD-MCNC: 16 MG/DL (ref 7–18)
GLUCOSE SERPLBLD-MCNC: 102 MG/DL (ref 74–106)
HCT VFR BLD CALC: 43.1 % (ref 39.6–49)
INR BLD: 1.27
LYMPHOCYTES # SPEC AUTO: 2.7 K/UL (ref 0.7–4.9)
PMV BLD: 9.8 FL (ref 7.6–11.3)
POTASSIUM SERPL-SCNC: 4.1 MMOL/L (ref 3.5–5.1)
RBC # BLD: 4.73 M/UL (ref 4.33–5.43)

## 2020-07-06 NOTE — RAD REPORT
EXAM DESCRIPTION:  RAD - Chest Pa And Lat (2 Views) - 7/6/2020 2:31 pm

 

CLINICAL HISTORY:  pre op, pending cardioversion

 

COMPARISON:  Two view chest April 28, 2020

 

TECHNIQUE:  Frontal and lateral views of the chest were obtained.

 

FINDINGS:  The lungs are clear.  Interstitial pattern matches comparison. Heart size is normal and ce
ntral vasculature is within normal limits.  No pleural effusion or pneumothorax seen.  No acute bony 
finding noted.  No aortic abnormality.

 

IMPRESSION:  No acute cardiopulmonary process.

## 2020-07-07 ENCOUNTER — HOSPITAL ENCOUNTER (OUTPATIENT)
Dept: HOSPITAL 97 - CCL | Age: 65
Discharge: HOME | End: 2020-07-07
Attending: INTERNAL MEDICINE
Payer: COMMERCIAL

## 2020-07-07 VITALS — DIASTOLIC BLOOD PRESSURE: 63 MMHG | OXYGEN SATURATION: 96 % | SYSTOLIC BLOOD PRESSURE: 113 MMHG

## 2020-07-07 VITALS — TEMPERATURE: 97.4 F

## 2020-07-07 DIAGNOSIS — K21.9: ICD-10-CM

## 2020-07-07 DIAGNOSIS — Z79.01: ICD-10-CM

## 2020-07-07 DIAGNOSIS — F41.9: ICD-10-CM

## 2020-07-07 DIAGNOSIS — E78.2: ICD-10-CM

## 2020-07-07 DIAGNOSIS — I48.0: Primary | ICD-10-CM

## 2020-07-07 DIAGNOSIS — I10: ICD-10-CM

## 2020-07-07 DIAGNOSIS — I25.10: ICD-10-CM

## 2020-07-07 DIAGNOSIS — Z11.59: ICD-10-CM

## 2020-07-07 PROCEDURE — 85730 THROMBOPLASTIN TIME PARTIAL: CPT

## 2020-07-07 PROCEDURE — 85025 COMPLETE CBC W/AUTO DIFF WBC: CPT

## 2020-07-07 PROCEDURE — 85610 PROTHROMBIN TIME: CPT

## 2020-07-07 PROCEDURE — 80048 BASIC METABOLIC PNL TOTAL CA: CPT

## 2020-07-07 PROCEDURE — 92960 CARDIOVERSION ELECTRIC EXT: CPT

## 2020-07-07 PROCEDURE — 36415 COLL VENOUS BLD VENIPUNCTURE: CPT

## 2020-07-07 PROCEDURE — 93005 ELECTROCARDIOGRAM TRACING: CPT

## 2020-07-07 PROCEDURE — 71046 X-RAY EXAM CHEST 2 VIEWS: CPT

## 2020-07-07 NOTE — OP
Date of Procedure:  07/07/2020



Surgeon:  Rogerio Coronel MD



Assistant:  Hannah Bustamante.



The patient admitted as an outpatient to the cath lab for a direct current cardioversion.  The direct
 current cardioversion was done for atrial fibrillation. 

Patient is a 64-year-old white male, has had a history of paroxysmal atrial fibrillation, was control
led on sotalol 80 b.i.d. that we had to go up to 160 b.i.d. after his last recurrent episode.  He has
 been taking Xarelto.  He also has a history of gastroesophageal reflux disease and mixed dyslipidemi
a.  Negative cardiac workup otherwise in the past.  His atrial fibrillation is symptomatic.



Procedure In Detail:  Brought to the cath lab today.  Given 10 mg of Versed IV push for sedation.  He
 had 1 shock with 200 joules and converted to sinus rhythm.  There were no complications or blood los
s.



Postoperative Diagnosis:  Atrial fibrillation, status post successful cardioversion to sinus rhythm. 




Patient will go home today when he wakes up.  He will continue his medication including sotalol 160 b
.i.d. and Xarelto.



Anesthesia:  Total conscious sedation was 30 minutes.





NB/RONNELL

DD:  07/07/2020 07:36:21Voice ID:  [id number]

DT:  07/07/2020 07:46:42Report ID:  [id number]

## 2020-07-07 NOTE — XMS REPORT
Continuity of Care Document

                             Created on:2020



Patient:SHARIFA MASSEY

Sex:Male

:1955

External Reference #:[id number]





Demographics







                          Address                   [address] [place] [address]

 

                          Home Phone                [phone]

 

                          Email Address             [email or web address]

 

                          Preferred Language        en

 

                          Marital Status            Unknown

 

                          Nondenominational Affiliation     Unknown

 

                          Race                      Unknown

 

                          Additional Race(s)        Unavailable

 

                          Ethnic Group              Unknown









Author







                          Organization              [facility]

t

 

                          Address                   [address] [place] [address] Dr. Sloan. [address] [place] [address]

 

                          Phone                     [phone]









Care Team Providers







                    Name                Role                Phone

 

                    DR JUAN           Attending Clinician Unavailable

 

                    DR JUAN           Admitting Clinician Unavailable









Problems

This patient has no known problems.



Allergies, Adverse Reactions, Alerts

This patient has no known allergies or adverse reactions.



Medications

This patient has no known medications.



Procedures

This patient has no known procedures.



Encounters







        Start   End     Encounter Admission Attending Care    Care    Encounter 

Source



        Date/Time Date/Time Type    Type    Clinicians Facility Department ID   

   

 

        2018 Outpatient GABRIELE CARRILLO     [facility]    [id number]

[facility]



        04:00:00 07:00:00                 [facility]







Results

This patient has no known results.

## 2020-08-20 NOTE — XMS REPORT
Ortho Sports Medicine Knee New Patient Visit     Assesment:   60 y o  male right knee mild medial and patellofemoral joint osteoarthritis    Plan:    Conservative treatment:    Ice to knee for 20 minutes at least 1-2 times daily  PT for ROM/strengthening to knee, hip and core  Possible referral to Dr Fifi Hubbard for right hip pain    Imaging: All imaging from today was reviewed by myself and explained to the patient  Injection:    The risks and benefits of the injection (which include but are not limited to: infection, bleeding,damage to nerve/artery, need for further intervention), as well as the risks and benefits of all alternative treatments were explained and understood  The patient elected to proceed with injection  The procedure was done with aseptic technique, and the patient tolerated the procedure well with no complications  A corticosteroid injection was performed  The patient should take 1-2 days off of activity, with gradual return to activity as tolerated  Ice to the knee 1-2 times daily for 20 minutes, for next 24-48 hrs  May consider future viscosupplementation injection depending on clinical exam/imaging  Surgery:      No surgery is recommended at this point, continue with conservative treatment plan as noted  Follow up:    Return in about 6 weeks (around 10/1/2020) for Recheck  Chief Complaint   Patient presents with    Left Knee - Pain    Right Knee - Pain     History of Present Illness: The patient is a 60 y o  male whose occupation is unknown, referred to me by their primary care physician, seen in clinic for consultation of right knee pain  Pain is located posterior  The patient rates the pain as a 8/10  The pain has been present for 1 month  The patient sustained an injury July 28, 2020  The mechanism of injury was while standing he felt a sharp pain that he feels in the back of his right knee    He states that it has happened 3 times on the right knee and Patient Summary Document

 Created on:2019



Patient:SHARIFA MASSEY

Sex:Male

:1955

External Reference #:[id number]





Demographics







 Address  [address] [place] [address]

 

 Home Phone  [phone]

 

 Work Phone  [phone]

 

 Email Address  [email or web address]

 

 Preferred Language  Unknown

 

 Marital Status  Unknown

 

 Islam Affiliation  Unknown

 

 Race  Unknown

 

 Additional Race(s)  Unavailable

 

 Ethnic Group  Unknown









Author







 Organization  [facility]

 

 Address  [address] [place] [address] Dr. Sloan. [address] [place] [address]

 

 Phone  [phone]









Care Team Providers







 Name  Role  Phone

 

 DR JESSICA MARTINEZ  Unavailable  Unavailable









Problems

This patient has no known problems.



Allergies, Adverse Reactions, Alerts

This patient has no known allergies or adverse reactions.



Medications

This patient has no known medications.



Encounters







 Start  End  Encounter  Admission  Attending  Care  Care  Encounter



 Date/Time  Date/Time  Type  Type  Clinicians  Facility  Department  ID

 

 2018  Outpatient  GABRIELE CARRILLO  [facility]  [id number]



 04:00:00  07:00:00      JESSICA once on the left  The pain is characterized as sharp, stabbing that lasts for 20 seconds  The pain is present occasionally  Pain is improved by rest  Pain is aggravated by standing  Symptoms include locking  The patient has tried rest and ice  Knee Surgical History:  Right knee meniscectomy- 1995    Past Medical, Social and Family History:  Past Medical History:   Diagnosis Date    Aneurysm of thoracic aorta ([address] )     last assessed 11/20/17    Anxiety     currently on no meds    Cardiac disease     Cholelithiasis     Chronic kidney disease     GERD (gastroesophageal reflux disease)     Headache due to anesthesia     Hyperlipidemia     Hypertension     Irritable bowel syndrome     Kidney stone     Palpitations     PONV (postoperative nausea and vomiting)     Shortness of breath     ? related to acid reflux    Sleep apnea     not sure    Stroke [facility]) 11/2014    TIA (transient ischemic attack)      Past Surgical History:   Procedure Laterality Date    AORTA SURGERY      thoracic - aneurysmorrhaphy    APPENDECTOMY      ASCENDING AORTIC ANEURYSM REPAIR      resolved 8/19/15    CHOLECYSTECTOMY      laparoscopic    CYSTOSCOPY      with removal of object- stent removal    KNEE ARTHROSCOPY Right 1996    with medial meniscus repair    LITHOTRIPSY      renal    CT FRAGMENT KIDNEY STONE/ ESWL Left 5/17/2018    Procedure: LITHROTRIPSY EXTRACORPORAL SHOCKWAVE (ESWL); Surgeon: Colin Pate MD;  Location: [facility];  Service: Urology    THUMB ARTHROSCOPY Right 1976    ligament repair     Allergies   Allergen Reactions    Losartan Angioedema    Bactrim [Sulfamethoxazole-Trimethoprim]     Lisinopril      Felt bad, was OK with Zestril brand name      Tramadol      Current Outpatient Medications on File Prior to Visit   Medication Sig Dispense Refill    amLODIPine (NORVASC) 5 mg tablet TAKE 1 TABLET BY MOUTH EVERY DAY 90 tablet 0    apixaban (ELIQUIS) 5 mg Take 1 tablet (5 mg total) by mouth 2 (two) times a day 60 tablet 5    aspirin (ECOTRIN LOW STRENGTH) 81 mg EC tablet Take 1 tablet (81 mg total) by mouth daily 30 tablet 0    atorvastatin (LIPITOR) 40 mg tablet Take 1 tablet (40 mg total) by mouth daily with dinner 30 tablet 5    cholecalciferol (VITAMIN D3) 1,000 units tablet Take 1 tablet (1,000 Units total) by mouth daily 30 tablet 5    famotidine (PEPCID) 20 mg tablet TAKE 1 TABLET BY MOUTH EVERY DAY 30 tablet 2    metoprolol succinate (TOPROL-XL) 50 mg 24 hr tablet TAKE 3 TABLETS BY MOUTH EVERY  tablet 1    pantoprazole (PROTONIX) 40 mg tablet TAKE 1 TABLET BY MOUTH EVERY DAY 90 tablet 0    [DISCONTINUED] famotidine (PEPCID) 20 mg tablet TAKE 1 TABLET BY MOUTH EVERY DAY 30 tablet 2     No current facility-administered medications on file prior to visit        Social History     Socioeconomic History    Marital status:      Spouse name: Not on file    Number of children: Not on file    Years of education: Not on file    Highest education level: Not on file   Occupational History    Occupation: disabled     Social Needs    Financial resource strain: Not on file    Food insecurity     Worry: Not on file     Inability: Not on file   [employer] needs     Medical: Not on file     Non-medical: Not on file   Tobacco Use    Smoking status: Never Smoker    Smokeless tobacco: Never Used    Tobacco comment: no second hand smoke exposure   Substance and Sexual Activity    Alcohol use: Not Currently    Drug use: No    Sexual activity: Not on file   Lifestyle    Physical activity     Days per week: Not on file     Minutes per session: Not on file    Stress: Not on file   Relationships    Social connections     Talks on phone: Not on file     Gets together: Not on file     Attends Christian service: Not on file     Active member of club or organization: Not on file     Attends meetings of clubs or organizations: Not on file     Relationship status: Not on file    Intimate partner violence     Fear of current or ex partner: Not on file     Emotionally abused: Not on file     Physically abused: Not on file     Forced sexual activity: Not on file   Other Topics Concern    Not on file   Social History Narrative    Caffeine use    Completed some college     as per [employer]     I have reviewed the past medical, surgical, social and family history, medications and allergies as documented in the EMR  Review of systems: ROS is negative other than that noted in the HPI  Constitutional: Negative for fatigue and fever  HENT: Negative for sore throat  Respiratory: Negative for shortness of breath  Cardiovascular: Negative for chest pain  Gastrointestinal: Negative for abdominal pain  Endocrine: Negative for cold intolerance and heat intolerance  Genitourinary: Negative for flank pain  Musculoskeletal: Negative for back pain  Skin: Negative for rash  Allergic/Immunologic: Negative for immunocompromised state  Neurological: Negative for dizziness  Psychiatric/Behavioral: Negative for agitation  Physical Exam:    Blood pressure 131/80, pulse 58, temperature 98 °F (36 7 °C), height 5' 8" (1 727 m), weight 108 kg (239 lb)  General/Constitutional: NAD, well developed, well nourished  HENT: Normocephalic, atraumatic  CV: Intact distal pulses, regular rate  Resp: No respiratory distress or labored breathing  Lymphatic: No lymphadenopathy palpated  Neuro: Alert and Oriented x 3, no focal deficits  Psych: Normal mood, normal affect, normal judgement, normal behavior  Skin: Warm, dry, no rashes, no erythema    Knee Exam (focused): RIGHT LEFT   ROM:   0-130 0-130   Palpation: Effusion negative negative     MJL tenderness Negative Negative     LJL tenderness Negative Negative   Meniscus:  Cosmo Negative Negative    Apley's Compression Negative Negative   Instability: Varus stable stable     Valgus stable stable   Special Tests: Lachman Negative Negative     Posterior drawer Negative Negative     Anterior drawer Negative Negative     Pivot shift not tested not tested     Dial not tested not tested   Patella: Palpation no tenderness no tenderness     Mobility 1/4 1/4     Apprehension Negative Negative   Other: Single leg 1/4 squat not tested not tested    Large joint arthrocentesis: R knee  Date/Time: 8/20/2020 8:38 AM  Consent given by: patient and parent  Site marked: site marked  Timeout: Immediately prior to procedure a time out was called to verify the correct patient, procedure, equipment, support staff and site/side marked as required   Supporting Documentation  Indications: pain and joint swelling   Procedure Details  Location: knee - R knee  Needle size: 22 G  Ultrasound guidance: no  Approach: anterolateral  Medications administered: 3 mL lidocaine 1 %; 1 mL methylPREDNISolone acetate 40 mg/mL    Patient tolerance: patient tolerated the procedure well with no immediate complications  Dressing:  Sterile dressing applied        LE NV Exam: +2 DP/PT pulses bilaterally  Sensation intact to light touch L2-S1 bilaterally     Bilateral hip ROM demonstrates no pain actively or passively    No calf tenderness to palpation bilaterally    Knee Imaging    X-rays of the right knee were reviewed, which demonstrate mild medial and patellofemoral joint osteoarthritis  I have reviewed the radiology report and agree with their impression      Scribe Attestation    I,:   Efrain Ramirez am acting as a scribe while in the presence of the attending physician :        I,:   Delfin Guevara DO personally performed the services described in this documentation    as scribed in my presence :

## 2020-09-28 NOTE — EKG
Test Date:    2020-04-28               Test Time:    08:55:25

Technician:   MICHELLE                                    

                                                     

MEASUREMENT RESULTS:                                       

Intervals:                                           

Rate:         51                                     

RI:           218                                    

QRSD:         92                                     

QT:           470                                    

QTc:          433                                    

Axis:                                                

P:            62                                     

RI:           218                                    

QRS:          64                                     

T:            64                                     

                                                     

INTERPRETIVE STATEMENTS:                                       

                                                     

Sinus bradycardia with 1st degree AV block

Otherwise normal ECG

Compared to ECG 08/23/2019 15:30:41

First degree AV block now present

Atrial fibrillation no longer present



Electronically Signed On 04-29-20 06:48:33 CDT by Rogerio Coronel Statement Selected

## 2021-04-07 LAB
ALBUMIN SERPL BCP-MCNC: 3.7 G/DL (ref 3.4–5)
ALP SERPL-CCNC: 38 U/L (ref 45–117)
ALT SERPL W P-5'-P-CCNC: 29 U/L (ref 12–78)
AMYLASE SERPL-CCNC: 41 U/L (ref 25–115)
AST SERPL W P-5'-P-CCNC: 16 U/L (ref 15–37)

## 2021-04-07 NOTE — RAD REPORT
EXAM DESCRIPTION:  RAD - Chest Pa And Lat (2 Views) - 4/7/2021 2:18 pm

 

CLINICAL HISTORY:  preop, pending cholecystectomy

 

COMPARISON:  July 2020

 

TECHNIQUE:  Frontal and lateral views of the chest were obtained.

 

FINDINGS:  The lungs are clear.  Lung parenchymal pattern matches comparison. Heart size is normal an
d central vasculature is within normal limits.  No pleural effusion or pneumothorax seen.  No acute b
abigail finding noted.  No aortic abnormality.

 

IMPRESSION:  No acute cardiopulmonary process.

 

 No significant change from comparison study.

## 2021-04-08 ENCOUNTER — HOSPITAL ENCOUNTER (OUTPATIENT)
Dept: HOSPITAL 97 - OR | Age: 66
Discharge: HOME HEALTH | End: 2021-04-08
Attending: SURGERY
Payer: COMMERCIAL

## 2021-04-08 VITALS — TEMPERATURE: 97.3 F | SYSTOLIC BLOOD PRESSURE: 117 MMHG | OXYGEN SATURATION: 93 % | DIASTOLIC BLOOD PRESSURE: 70 MMHG

## 2021-04-08 DIAGNOSIS — K80.10: Primary | ICD-10-CM

## 2021-04-08 DIAGNOSIS — Z20.822: ICD-10-CM

## 2021-04-08 PROCEDURE — 82150 ASSAY OF AMYLASE: CPT

## 2021-04-08 PROCEDURE — 71046 X-RAY EXAM CHEST 2 VIEWS: CPT

## 2021-04-08 PROCEDURE — 80076 HEPATIC FUNCTION PANEL: CPT

## 2021-04-08 PROCEDURE — 47562 LAPAROSCOPIC CHOLECYSTECTOMY: CPT

## 2021-04-08 PROCEDURE — 0FT44ZZ RESECTION OF GALLBLADDER, PERCUTANEOUS ENDOSCOPIC APPROACH: ICD-10-PCS

## 2021-04-08 PROCEDURE — 36415 COLL VENOUS BLD VENIPUNCTURE: CPT

## 2021-04-08 PROCEDURE — 88304 TISSUE EXAM BY PATHOLOGIST: CPT

## 2021-04-08 PROCEDURE — 88302 TISSUE EXAM BY PATHOLOGIST: CPT

## 2021-04-08 RX ADMIN — HYDROMORPHONE HYDROCHLORIDE ONE MG: 1 INJECTION, SOLUTION INTRAMUSCULAR; INTRAVENOUS; SUBCUTANEOUS at 10:30

## 2021-04-08 RX ADMIN — HYDROMORPHONE HYDROCHLORIDE ONE MG: 1 INJECTION, SOLUTION INTRAMUSCULAR; INTRAVENOUS; SUBCUTANEOUS at 10:35

## 2021-04-08 NOTE — OP
Date of Procedure:  04/08/2021



Surgeon:  Nishant Younger MD



Assistant:  GARRET Painter.



Preoperative Diagnosis:  Chronic cholecystitis and cholelithiasis.



Postoperative Diagnosis:  Chronic cholecystitis and cholelithiasis.



Procedure:  Laparoscopic cholecystectomy.



Estimated Blood Loss:  Minimal.



Specimen:  Gallbladder.



Finding:  As above.



Anesthesia:  General.



Complications:  None.



Disposition:  The patient tolerated the procedure in stable condition and taken to Recovery in good g
eneral condition.



Procedure In Detail:  The patient was brought to the OR and placed in supine position.  General anest
hesia begun.  The patient was prepped and draped in usual sterile fashion.  Marcaine 0.5% was infiltr
ated locally.  A 15-blade was used to make a 1 cm supraumbilical midline incision.  Subcutaneous tiss
ue divided.  Fascia was identified and divided.  A #1 Vicryl stay suture was placed.  Peritoneal cavi
ty was entered with sharp and blunt dissection.  A 12 mm trocar was placed into the peritoneal cavity
 under direct vision.  Pneumoperitoneum was established and then three 5 mm trocars were placed, 1 in
 the epigastrium just to the right of midline and 2 in the right subcostal region.  Laparoscopy revea
led chronic inflammation of the gallbladder.  Fundus retracted superiorly.  Infundibulum was identifi
ed and retracted inferolaterally, and blunt dissection was utilized to identify the cystic duct and c
ystic artery.  Clips were placed.  Both structures were divided.  Cautery was used to remove the gall
bladder from the liver bed.  Bleeding on the liver bed was controlled with cautery.  Gallbladder was 
retrieved through the umbilicus via an EndoCatch bag.  Right upper quadrant was examined and no evide
nce of bleeding or bile leakage appreciated.  Subsequently, all trocars were removed under direct vis
ion.  Stay sutures were tied to each other to approximate the fascial defect.  Subcutaneous wounds we
re irrigated.  Bleeding was controlled with cautery.  A 3-0 chromic was used to approximate the subcu
taneous tissue and close the skin.  Sterile dressing was applied.  The patient was awakened and taken
 to Recovery in good general condition.



Discharge Note:  The patient will go to Day Surgery and home when stable.



Disposition:  Home.



Condition:  Stable.



Discharge Instructions:  Resume home medications and diet.  Activity as tolerated.  No heavy lifting.
  Remove outer dressing in 2 days.  Shower.  Keep wound clean and dry.  Keep Steri-Strips on at all t
imes.  Incentive spirometry as ordered.  Tylenol No. 3 one tablet p.o. q.4 p.r.n. pain.  Follow up in
 my office in a week.  Call for appointment.





AS/RONNELL

DD:  04/08/2021 11:10:27Voice ID:  [id number]

DT:  04/08/2021 12:08:54Report ID:  [id number]

## 2025-02-16 ENCOUNTER — HOSPITAL ENCOUNTER (EMERGENCY)
Dept: HOSPITAL 97 - ER | Age: 70
Discharge: HOME | End: 2025-02-16
Payer: COMMERCIAL

## 2025-02-16 VITALS — SYSTOLIC BLOOD PRESSURE: 127 MMHG | DIASTOLIC BLOOD PRESSURE: 79 MMHG | OXYGEN SATURATION: 99 %

## 2025-02-16 VITALS — TEMPERATURE: 98 F

## 2025-02-16 DIAGNOSIS — R33.9: Primary | ICD-10-CM

## 2025-02-16 LAB
UA COMPLETE W REFLEX CULTURE PNL UR: (no result)
UA COMPLETE W REFLEX CULTURE PNL UR: (no result)

## 2025-02-16 PROCEDURE — 51702 INSERT TEMP BLADDER CATH: CPT

## 2025-02-16 PROCEDURE — 99284 EMERGENCY DEPT VISIT MOD MDM: CPT

## 2025-02-16 PROCEDURE — 81001 URINALYSIS AUTO W/SCOPE: CPT

## 2025-02-16 NOTE — XMS REPORT
Continuity of Care Document



                          Created on: 2025





SHARIFA ÁLVAREZ

External Reference #: [id number]

: 1955

Sex: Male



Demographics





                                        Address             [address] [place] [address]  [address]

 

                                        Home Phone          [phone]

 

                                        Mobile Phone        [phone]

 

                                        Email Address       [email or web address]

 

                                        Preferred Language  English

 

                                        Marital Status      Unknown

 

                                        Mormonism Affiliation Unknown

 

                                        Race                Unknown

 

                                        Additional Race(s)  Unavailable

White

 

                                        Ethnic Group        Unknown





Author





                                        Name                Unknown

 

                                        Address             [address]

[place]  [address]

 

                                        [facility]

[employer]

 

                                        Address             [address]

[address]

[place]  [address]

 

                                        Phone               [phone]





Support





                          Name         Relationship Address      Phone

 

                          Lisbeth Álvarez   Spouse       Unknown      [phone]





Care Team Providers





                                Care Team Member Name Role            Phone

 

                                Miles Duran Primary Care Physician [phone] 

 

                                GARLAND MENA  Attending Clinician Unavailable

 

                                Therapy, Adc Covid Infusion Attending Clinician 

Unavailable

 

                                Garland Mena MD Attending Clinician [phone]

 

                                Doctor Unassigned, No Name Attending Clinician U

DR JESSICA Dasilva Attending Clinician Unavailable

 

                                DR JESSICA MARTINEZ Admitting Clinician Unavailable







Payers





                    Payer Name Policy Type Policy Number Effective Date Expirati

on Date Source

 

                                                    MEDICARE PART A \T\ 

B                                       [id number]         2019 

00:00:00                                            

 

                                                    [employer] 

OUT OF NETWORK                          [id number]          2019 

00:00:00                                            







Allergies, Adverse Reactions, Alerts





                                                    Allergy 

Name                                    Allergy 

Type            Status          Severity        Reaction(s)     Onset 

Date                                    Inactive 

Date                                    Treating 

Clinician                 Comments                  Source

 

                                                    NO KNOWN 

ALLERGIE

S                                       Drug 

Class   Active                                                  [facility]

[facility]







Social History





                    Social Habit Start Date Stop Date Quantity  Comments  Source

 

                                                    Sex Assigned At 

Birth                                   1955 

00:00:[birth year]                                1955 

00:00:00                                                    [facility]







                          Smoking Status Start Date   Stop Date    Source

 

                          Unknown if ever smoked                           [facility]







Medications





                                                    Ordered 

Medication 

Name                                    Filled 

Medication 

Name                                    Start 

Date                                    Stop 

Date                                    Current 

Medication?                             Ordering 

Clinician       Indication      Dosage          Frequency       Signature 

(SIG)               Comments            Components          Source

 

                                                    casirivimab

-imdevimab 

(REGEN-COV 

(EUA)) 

injection 

1,200 mg                                             

17:30:

00                                       

16:16

:00        No                    [id number]  1200mg                1,200 mg, 

Subcutaneo

us, ONCE, 

1 dose, On 

Sat 

21 at 

1230, 

Routine                                                     [facility]







Vital Signs





                      Vital Name Observation Time Observation Value Rajesh moulton

 

                                                    Systolic blood 

pressure        2021 17:01:00 122 mm[Hg]                      [facility]

 

                                                    Diastolic blood 

pressure        2021 17:01:00 69 mm[Hg]                       [facility]

 

                      Heart rate 2021 17:01:00 78 /min               [facility]

 

                      Body temperature 2021 17:01:00 37.22 Kaleigh            

 [facility]

 

                      Respiratory rate 2021 17:01:00 21 /min              

 [facility]

 

                                                    Oxygen saturation in 

Arterial blood by 

Pulse oximetry  2021 17:01:00 95 /min                         [facility]

 

                      Body height 2021 16:15:00 193 cm                [facility]

 

                      Body weight 2021 16:15:00 99.791 kg             [facility]

 

                      BMI        2021 16:15:00 26.78 kg/m2            [facility]







Procedures





                          Procedure    Date / Time Performed Performing Clinicia

n Source

 

                                IMMTRAC2 CONSENT 2021 05:01:00 Doctor Unas

signed, No 

Name                                    [facility]







Encounters





                                                    Start 

Date/Time                               End 

Date/Time                               Encounter 

Type                                    Admission 

Type                                    Attending 

Clinicians                              Care 

Facility                                Care 

Department                              Encounter 

ID                                      Source

 

                                                    2021 

11:00:00                                2021 

11:00:00            Outpatient          GARLAND CEBALLOS           [facility]            [id number]      [facility]

 

                                                    2021 

08:01:19                                2021 

09:01:19                                Nurse 

Visit                                               Therapy, 

Adc Covid 

Infusion

Garland Mena                                 [facility]                                  [id number]

[id number]

[id number]

[phone]

053                       [id number]                  [facility]

 

                                                    2021 

00:00:00                                2021 

00:00:00                                Orders 

Only                                                Doctor 

Unassigned, 

No Name                                 [facility]                                [id number]

[id number]

[id number]

[phone]

009                       [id number]                  [facility]

 

                                                    2018 

04:00:00                                2018 

07:00:00            Outpatient          JESSICA CARRILLO          [facility]            [id number]      [facility]

## 2025-02-16 NOTE — EDPHYS
Physician Documentation                                                                           

 [facility]                                                                 

Name: Lee Álvarez                                                                                  

Age: 69 yrs                                                                                       

Sex: Male                                                                                         

: 1955                                                                                   

MRN: [id number]                                                                                   

Arrival Date: 2025                                                                          

Time: 11:37                                                                                       

Account#: [id number]                                                                            

Bed 6                                                                                             

Private MD:                                                                                       

ED Physician Mateo Barber                                                                        

HPI:                                                                                              

                                                                                             

12:11 This 69 yrs old  Male presents to ER via Unassigned with complaints of Urinary ec2 

      Problem.                                                                                    

12:11 Patient arrives today for decreased urinary frequency. Patient reports history of       ec2 

      prostatic issues, reports he has been having decreased urinary frequency, has had           

      previous Resendiz catheter is in place. Patient reports no fevers, no nausea or vomiting,      

      no discoloration of the urine..                                                             

                                                                                                  

Historical:                                                                                       

- Allergies:                                                                                      

13:12 No Known Allergies;                                                                     hb  

- PMHx:                                                                                           

13:12 Atrial Fib; GERD; Hernia; Hypertension;                                                 hb  

- PSHx:                                                                                           

13:12 TURP (Unknown); cardioversion (Unknown);                                                hb  

                                                                                                  

- Immunization history:: Adult Immunizations up to date.                                          

- Infectious Disease History:: Denies.                                                            

- Social history:: Smoking status: Patient denies any tobacco usage or history of.                

                                                                                                  

                                                                                                  

ROS:                                                                                              

12:11 Constitutional: as per hpi                                                              ec2 

                                                                                                  

Exam:                                                                                             

12:11 Constitutional:  GEN: NAD                                                                   

Head: atraumatic                                                                                  

Eyes: EOMI                                                                                        

Ears: External ears are          ec2                                                              

      normal.                                                                                     

CV: regular rate                                                                                  

LUNGS: no respiratory distress                                                                    

ABD: non-distended                                                                                

SKIN: no                                                                                          

      evidence of rashes                                                                          

MSK: no evidence of trauma                                                                        

                                                                                                  

Vital Signs:                                                                                      

13:10  / 85; Pulse 66; Resp 16; Temp 98; Weight 97.52 kg; Height 6 ft. 4 in. ; Pain     hb  

      4/10;                                                                                       

14:33  / 79; Pulse 71; Resp 16; Pulse Ox 99% ;                                          bp  

13:10 Body Mass Index 26.17 (97.52 kg, 193.04 cm)                                             hb  

13:10 Pain Scale: Adult                                                                       hb  

                                                                                                  

MDM:                                                                                              

12:04 Medical Screening Exam initiated                                                        ec2 

12:11 Data reviewed: vital signs, nurses notes. ED course: Patient arrives today for          ec2 

      evaluation of urinary complaints. Examination is unrevealing. Will place Resendiz              

      catheter, send UA and have patient follow-up with urology for urinary retention..           

13:48 ED course: Urine noninfectious. Resendiz placed without issue. Will discharge home. Have   ec2 

      the patient follow-up with urology..                                                        

                                                                                                  

                                                                                             

12:11 Order name: UAM; Complete Time: 13:47                                                   ec2 

                                                                                             

12:11 Order name: Resendiz; Complete Time: 13:19                                                 ec2 

                                                                                             

12:11 Order name: Resendiz Leg Bag; Complete Time: 14:31                                         ec2 

                                                                                                  

Administered Medications:                                                                         

No medications were administered                                                                  

                                                                                                  

                                                                                                  

Disposition Summary:                                                                              

25 13:48                                                                                    

Discharge Ordered                                                                                 

 Notes:       Location: Home                                                                        
  ec2

      Condition: Stable                                                                       ec2 

      Diagnosis                                                                                   

        - Retention of urine, unspecified                                                     ec2 

      Followup:                                                                               ec2 

        - With: Jian Barnett MD                                                                

        - When:                                                                                    

        - Reason: Recheck today's complaints                                                       

      Discharge Instructions:                                                                     

        - Discharge Summary Sheet                                                             ec2 

        - Acute Urinary Retention, Male                                                       ec2 

      Forms:                                                                                      

        - Medication Reconciliation Form                                                      ec2 

        - Antibiotic Education                                                                ec2 

        - Prescription Opioid Use                                                             ec2 

        - Patient Portal Instructions                                                         ec2 

        - Leadership Thank You Letter                                                         ec2 

Signatures:                                                                                       

Dispatcher [employer]                           Jackie Schroeder RN RN                                                      

Mateo Barber MD MD   ec2                                                  

                                                                                                  

**************************************************************************************************

## 2025-02-16 NOTE — ER
Nurse's Notes                                                                                     

 [facility]                                                                 

Name: Lee Álvarez                                                                                  

Age: 69 yrs                                                                                       

Sex: Male                                                                                         

: 1955                                                                                   

MRN: [id number]                                                                                   

Arrival Date: 2025                                                                          

Time: 11:37                                                                                       

Account#: [id number]                                                                            

Bed 6                                                                                             

Private MD:                                                                                       

Diagnosis: Retention of urine, unspecified                                                        

                                                                                                  

Presentation:                                                                                     

                                                                                             

13:10 Chief complaint: Patient states: unable to completely empty bladder since Friday. Had   hb  

      abd US that showed urinary retention. Had TURP >10 years ago. pain 4/10 pressure.           

      Coronavirus screen: Vaccine status: Patient reports receiving the 2nd dose of the covid     

      vaccine. Ebola Screen: No symptoms or risks identified at this time. Initial Sepsis         

      Screen: Does the patient meet any 2 criteria? No. Patient's initial sepsis screen is        

      negative. Does the patient have a suspected source of infection? No. Patient's initial      

      sepsis screen is negative. Risk Assessment: Do you want to hurt yourself or someone         

      else? Patient reports no desire to harm self or others. Onset of symptoms was 2025.                                                                                   

13:10 Method Of Arrival: Ambulatory                                                           hb  

13:10 Acuity: GERRI 3                                                                           hb  

                                                                                                  

Triage Assessment:                                                                                

13:13 General: Appears uncomfortable, Behavior is calm, cooperative, appropriate for age.     hb  

      Pain: Complains of pain in suprapubic area Pain does not radiate. Pain currently is 4       

      out of 10 on a pain scale. Quality of pain is described as pressure, Pain began 2-3         

      days ago. Is continuous. Neuro: Level of Consciousness is awake, alert, obeys commands,     

      Oriented to person, place, time, situation, Appropriate for age. Cardiovascular:            

      Patient's skin is warm and dry. Respiratory: Airway Respiratory effort is even,             

      unlabored, Respiratory pattern is regular, symmetrical. : Reports inability to void,      

      pain. Derm: Skin is intact, is healthy with good turgor, Skin is pink, warm \T\ dry.        

      Musculoskeletal: No signs and/or symptoms reported regarding the musculoskeletal system.    

                                                                                                  

Historical:                                                                                       

- Allergies:                                                                                      

13:12 No Known Allergies;                                                                     hb  

- PMHx:                                                                                           

13:12 Atrial Fib; GERD; Hernia; Hypertension;                                                 hb  

- PSHx:                                                                                           

13:12 TURP (Unknown); cardioversion (Unknown);                                                hb  

                                                                                                  

- Immunization history:: Adult Immunizations up to date.                                          

- Infectious Disease History:: Denies.                                                            

- Social history:: Smoking status: Patient denies any tobacco usage or history of.                

                                                                                                  

                                                                                                  

Screenin:15 [facility] ED Fall Risk Assessment (Adult) History of falling in the last 3 months,       bp  

      including since admission No falls in past 3 months (0 pts) Confusion or Disorientation     

      No (0 pts) Intoxicated or Sedated No (0 pts) Impaired Gait No (0 pts) Mobility Assist       

      Device Used No (0 pt) Altered Elimination No (0 pt) Score/Fall Risk Level 0 - 2 = Low       

      Risk Oriented to surroundings. Abuse screen: Denies threats or abuse. Denies injuries       

      from another. Nutritional screening: No deficits noted. Tuberculosis screening: No          

      symptoms or risk factors identified.                                                        

                                                                                                  

Assessment:                                                                                       

13:15 General: Appears in no apparent distress. uncomfortable, Behavior is calm, cooperative, bp  

      appropriate for age.                                                                        

                                                                                                  

Vital Signs:                                                                                      

13:10  / 85; Pulse 66; Resp 16; Temp 98; Weight 97.52 kg; Height 6 ft. 4 in. ; Pain     hb  

      4/10;                                                                                       

14:33  / 79; Pulse 71; Resp 16; Pulse Ox 99% ;                                          bp  

13:10 Body Mass Index 26.17 (97.52 kg, 193.04 cm)                                             hb  

13:10 Pain Scale: Adult                                                                       hb  

                                                                                                  

ED Course:                                                                                        

11:40 Patient arrived in ED.                                                                  al6 

11:44 Mateo Barber MD is Attending Physician.                                               ec2 

12:59 Brian Avendano, RN is Primary Nurse.                                                    bp  

13:12 Triage completed.                                                                       hb  

13:12 Arm band placed on Patient placed in an exam room.                                      hb  

13:15 Patient has correct armband on for positive identification.                             bp  

13:15 No provider procedures requiring assistance completed. Patient did not have IV access   bp  

      during this emergency room visit.                                                           

13:19 Resendiz cath inserted, using sterile technique, 16 Fr., by me, balloon inflated, to       bp  

      gravity drainage, urine specimen collected. returned clear yellow urine. Patient            

      tolerated well.                                                                             

13:48 Jian Barnett MD is Referral Physician.                                              ec2 

14:33 Provided Education on: na.                                                              bp  

                                                                                                  

Administered Medications:                                                                         

No medications were administered                                                                  

                                                                                                  

                                                                                                  

Medication:                                                                                       

14:33 VIS not applicable for this client.                                                     bp  

                                                                                                  

Outcome:                                                                                          

13:48 Discharge ordered by MD.                                                                ec2 

14:32 Discharged to home ambulatory, with family,                                             bp  

14:32 Condition: stable                                                                           

14:32 Discharge instructions given to patient, Instructed on discharge instructions, follow       

      up and referral plans. Demonstrated understanding of instructions, follow-up care,          

14:42 Patient left the ED.                                                                    bp  

                                                                                                  

Signatures:                                                                                       

Jackie Hightower RN                     RN   Brian Eli RN                      RN   bp                                                   

Mateo Barber MD MD   ec2                                                  

Gemini Jeong                               al6                                                  

                                                                                                  

**************************************************************************************************